# Patient Record
Sex: FEMALE | Race: WHITE | Employment: OTHER | URBAN - METROPOLITAN AREA
[De-identification: names, ages, dates, MRNs, and addresses within clinical notes are randomized per-mention and may not be internally consistent; named-entity substitution may affect disease eponyms.]

---

## 2017-11-15 ENCOUNTER — OFFICE VISIT (OUTPATIENT)
Dept: INTERNAL MEDICINE CLINIC | Age: 52
End: 2017-11-15

## 2017-11-15 VITALS
DIASTOLIC BLOOD PRESSURE: 78 MMHG | OXYGEN SATURATION: 100 % | WEIGHT: 199 LBS | BODY MASS INDEX: 30.16 KG/M2 | SYSTOLIC BLOOD PRESSURE: 117 MMHG | RESPIRATION RATE: 16 BRPM | HEIGHT: 68 IN | HEART RATE: 64 BPM | TEMPERATURE: 98.1 F

## 2017-11-15 DIAGNOSIS — G43.709 CHRONIC MIGRAINE WITHOUT AURA WITHOUT STATUS MIGRAINOSUS, NOT INTRACTABLE: ICD-10-CM

## 2017-11-15 DIAGNOSIS — Z00.00 PHYSICAL EXAM, ANNUAL: Primary | ICD-10-CM

## 2017-11-15 DIAGNOSIS — F51.01 PRIMARY INSOMNIA: ICD-10-CM

## 2017-11-15 DIAGNOSIS — Z23 ENCOUNTER FOR IMMUNIZATION: ICD-10-CM

## 2017-11-15 RX ORDER — PROGESTERONE 100 MG/1
CAPSULE ORAL
Refills: 3 | COMMUNITY
Start: 2017-10-23 | End: 2022-03-29

## 2017-11-15 RX ORDER — TEMAZEPAM 22.5 MG/1
CAPSULE ORAL
Refills: 0 | COMMUNITY
Start: 2017-10-30 | End: 2017-11-15 | Stop reason: SDUPTHER

## 2017-11-15 RX ORDER — TEMAZEPAM 22.5 MG/1
CAPSULE ORAL
Qty: 30 CAP | Refills: 3 | Status: SHIPPED | OUTPATIENT
Start: 2017-11-15 | End: 2018-03-20 | Stop reason: SDUPTHER

## 2017-11-15 RX ORDER — IBUPROFEN 800 MG/1
TABLET ORAL
Refills: 1 | COMMUNITY
Start: 2017-11-12

## 2017-11-15 NOTE — MR AVS SNAPSHOT
Visit Information Date & Time Provider Department Dept. Phone Encounter #  
 11/15/2017  9:30 AM Suman Gao, [address] [place] [address],[address] [phone] [id number] Upcoming Health Maintenance Date Due DTaP/Tdap/Td series (1 - Tdap) 2/27/1987 FOBT Q 1 YEAR AGE 50-75 2/27/2016 BREAST CANCER SCRN MAMMOGRAM 12/12/2018 PAP AKA CERVICAL CYTOLOGY 4/10/2020 Allergies as of 11/15/2017  Review Complete On: 11/15/2017 By: Suman Gao MD  
 No Known Allergies Current Immunizations  Never Reviewed Name Date Influenza Vaccine 11/1/2017 Not reviewed this visit You Were Diagnosed With   
  
 Codes Comments Physical exam, annual    -  Primary ICD-10-CM: Z00.00 ICD-9-CM: V70.0 Primary insomnia     ICD-10-CM: F51.01 
ICD-9-CM: 307.42 Chronic migraine without aura without status migrainosus, not intractable     ICD-10-CM: B02.039 ICD-9-CM: 346.70 Vitals BP Pulse Temp Resp Height(growth percentile) Weight(growth percentile) 117/78 (BP 1 Location: Left arm, BP Patient Position: Sitting) 64 98.1 °F (36.7 °C) (Oral) 16 5' 8\" (1.727 m) 199 lb (90.3 kg) SpO2 BMI OB Status Smoking Status 100% 30.26 kg/m2 Postmenopausal Never Smoker Vitals History BMI and BSA Data Body Mass Index Body Surface Area  
 30.26 kg/m 2 2.08 m 2 Preferred Pharmacy Pharmacy Name Phone [facility] [address] AT [address] [phone] Your Updated Medication List  
  
   
This list is accurate as of: 11/15/17  9:37 AM.  Always use your most recent med list.  
  
  
  
  
 ibuprofen 800 mg tablet Commonly known as:  MOTRIN  
TK 1 T PO Q 8 H PRN  
  
 progesterone 100 mg capsule Commonly known as:  PROMETRIUM TK ONE C PO QD  
  
 temazepam 22.5 mg capsule Commonly known as:  RESTORIL TK 1 C PO QHS PRF SLEEP Prescriptions Printed Refills temazepam (RESTORIL) 22.5 mg capsule 3 Sig: TK 1 C PO QHS PRF SLEEP Class: Print We Performed the Following CBC W/O DIFF [98411 CPT(R)] HEMOGLOBIN A1C WITH EAG [63874 CPT(R)] LIPID PANEL [73158 CPT(R)] METABOLIC PANEL, COMPREHENSIVE [98999 CPT(R)] OCCULT BLOOD, IMMUNOASSAY (FIT) D9545550 CPT(R)] TSH 3RD GENERATION [07260 CPT(R)] URINALYSIS W/ RFLX MICROSCOPIC [14452 CPT(R)] VITAMIN D, 25 HYDROXY [id number] CPT(R)] Introducing [facility]! Anita Mahan introduces [employer] patient portal. Now you can access parts of your medical record, email your doctor's office, and request medication refills online. 1. In your internet browser, go to [email or web address] 2. Click on the First Time User? Click Here link in the Sign In box. You will see the New Member Sign Up page. 3. Enter your [employer] Access Code exactly as it appears below. You will not need to use this code after youve completed the sign-up process. If you do not sign up before the expiration date, you must request a new code. · [employer] Access Code: [email or web address] Expires: 2/13/2018  9:37 AM 
 
4. Enter the last four digits of your Social Security Number (xxxx) and Date of Birth (mm/dd/yyyy) as indicated and click Submit. You will be taken to the next sign-up page. 5. Create a Jumpzter ID. This will be your [employer]ter login ID and cannot be changed, so think of one that is secure and easy to remember. 6. Create a [employer] password. You can change your password at any time. 7. Enter your Password Reset Question and Answer. This can be used at a later time if you forget your password. 8. Enter your e-mail address. You will receive e-mail notification when new information is available in [address] 9. Click Sign Up. You can now view and download portions of your medical record. 10. Click the Download Summary menu link to download a portable copy of your medical information. If you have questions, please visit the Frequently Asked Questions section of the [employer] website. Remember, [employer] is NOT to be used for urgent needs. For medical emergencies, dial 911. Now available from your iPhone and Android! Please provide this summary of care documentation to your next provider. Your primary care clinician is listed as Kaya Lou. If you have any questions after today's visit, please call [phone].
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
## 2017-11-24 LAB — HEMOCCULT STL QL IA: NEGATIVE

## 2018-03-20 DIAGNOSIS — F51.01 PRIMARY INSOMNIA: Primary | ICD-10-CM

## 2018-03-22 RX ORDER — TEMAZEPAM 22.5 MG/1
CAPSULE ORAL
Qty: 30 CAP | Refills: 2 | Status: SHIPPED | OUTPATIENT
Start: 2018-03-22 | End: 2018-03-28 | Stop reason: SDUPTHER

## 2018-03-22 NOTE — TELEPHONE ENCOUNTER
Following rx was faxed to pharmacy with confirmation received:      temazepam (RESTORIL) 22.5 mg capsule [[id number]]   Order Details   Dose, Route, Frequency: As Directed    Dispense Quantity:  30 Cap Refills:  2 Fills Remaining:  --           Sig: TAKE 1 CAPSULE BY MOUTH EVERY NIGHT AT BEDTIME AS NEEDED FOR SLEEP          Written Date:  03/22/18 Expiration Date:  --     Start Date:  03/22/18 End Date:  --            Ordering Provider:  -- NICKY #:  -- NPI:  --    Authorizing Provider:  Radha Parikh MD NICKY #:  [id number] NPI:  [id number]    Ordering User:  Radha Parikh MD

## 2018-03-28 DIAGNOSIS — F51.01 PRIMARY INSOMNIA: ICD-10-CM

## 2018-03-28 RX ORDER — TEMAZEPAM 22.5 MG/1
CAPSULE ORAL
Qty: 90 CAP | Refills: 1 | Status: SHIPPED | OUTPATIENT
Start: 2018-03-28 | End: 2018-10-04 | Stop reason: SDUPTHER

## 2018-03-28 NOTE — TELEPHONE ENCOUNTER
----- Message from Renuka Azar sent at 3/27/2018  4:51 PM EDT -----  Regarding: Dr. Joseph Del Real  Pt requesting for further refills of medication \"Tranzepam\" to be sent to [facility] mail order pharmacy with a 3 month supply.  Best contact number [phone]      Message copied/pasted from [facility]

## 2018-03-28 NOTE — TELEPHONE ENCOUNTER
PCP: Elias Pickard MD    Last appt: 11/15/2017  Future Appointments  Date Time Provider Emily Rivas   11/19/2018 10:30 AM Elias Pickard MD [facility]       Requested Prescriptions     Pending Prescriptions Disp Refills    temazepam (RESTORIL) 22.5 mg capsule 90 Cap 1     Sig: TAKE 1 CAPSULE BY MOUTH EVERY NIGHT AT BEDTIME AS NEEDED FOR SLEEP

## 2018-03-29 ENCOUNTER — DOCUMENTATION ONLY (OUTPATIENT)
Dept: INTERNAL MEDICINE CLINIC | Age: 53
End: 2018-03-29

## 2018-03-29 NOTE — PROGRESS NOTES
Following rx was faxed to pharmacy with confirmation received:      temazepam (RESTORIL) 22.5 mg capsule [[id number]]   Order Details   Dose, Route, Frequency: As Directed    Dispense Quantity:  90 Cap Refills:  1 Fills Remaining:  --           Sig: TAKE 1 CAPSULE BY MOUTH EVERY NIGHT AT BEDTIME AS NEEDED FOR SLEEP          Written Date:  03/28/18 Expiration Date:  --     Start Date:  03/28/18 End Date:  --

## 2018-04-02 ENCOUNTER — TELEPHONE (OUTPATIENT)
Dept: INTERNAL MEDICINE CLINIC | Age: 53
End: 2018-04-02

## 2018-04-02 NOTE — TELEPHONE ENCOUNTER
Pt stated she called last week regarding her 'Temazepam\"  22 mg medication to be sent to mail order but the pharmacy has not receive anything.  Best contact [phone]     Message received & copied from [facility]

## 2018-04-02 NOTE — TELEPHONE ENCOUNTER
Called and spoke to pt. Two pt identifiers confirmed. Confirmed with pt correct pharmacy. Pt confirmed [facility] care gabriella. Confirmed with pt local pharmacy is still [facility] on file. Rx for Temazepam faxed to [facility]. Fax confirmation received. Called [facility] and canceled rx for Temazepam.    Pt had no further comments or questions at time of call.

## 2018-09-18 ENCOUNTER — OFFICE VISIT (OUTPATIENT)
Dept: INTERNAL MEDICINE CLINIC | Age: 53
End: 2018-09-18

## 2018-09-18 VITALS
TEMPERATURE: 98.3 F | DIASTOLIC BLOOD PRESSURE: 79 MMHG | HEIGHT: 68 IN | OXYGEN SATURATION: 98 % | SYSTOLIC BLOOD PRESSURE: 121 MMHG | WEIGHT: 200 LBS | RESPIRATION RATE: 16 BRPM | BODY MASS INDEX: 30.31 KG/M2 | HEART RATE: 87 BPM

## 2018-09-18 DIAGNOSIS — Z13.220 LIPID SCREENING: ICD-10-CM

## 2018-09-18 DIAGNOSIS — L02.92 BOIL: ICD-10-CM

## 2018-09-18 DIAGNOSIS — G43.709 CHRONIC MIGRAINE WITHOUT AURA WITHOUT STATUS MIGRAINOSUS, NOT INTRACTABLE: Primary | ICD-10-CM

## 2018-09-18 DIAGNOSIS — B37.9 YEAST INFECTION: ICD-10-CM

## 2018-09-18 DIAGNOSIS — Z13.1 ENCOUNTER FOR SCREENING EXAMINATION FOR IMPAIRED GLUCOSE REGULATION AND DIABETES MELLITUS: ICD-10-CM

## 2018-09-18 DIAGNOSIS — Z23 ENCOUNTER FOR IMMUNIZATION: ICD-10-CM

## 2018-09-18 DIAGNOSIS — Z12.11 COLON CANCER SCREENING: ICD-10-CM

## 2018-09-18 DIAGNOSIS — E55.9 VITAMIN D DEFICIENCY: ICD-10-CM

## 2018-09-18 RX ORDER — FLUCONAZOLE 150 MG/1
150 TABLET ORAL DAILY
Qty: 1 TAB | Refills: 0 | Status: SHIPPED | OUTPATIENT
Start: 2018-09-18 | End: 2018-09-19

## 2018-09-18 RX ORDER — SULFAMETHOXAZOLE AND TRIMETHOPRIM 800; 160 MG/1; MG/1
1 TABLET ORAL 2 TIMES DAILY
Qty: 20 TAB | Refills: 0 | Status: SHIPPED | OUTPATIENT
Start: 2018-09-18 | End: 2018-09-28

## 2018-09-18 RX ORDER — GABAPENTIN 300 MG/1
300 CAPSULE ORAL
COMMUNITY
End: 2022-01-06 | Stop reason: SDUPTHER

## 2018-09-18 RX ORDER — RIZATRIPTAN BENZOATE 10 MG/1
10 TABLET ORAL
Qty: 6 TAB | Refills: 1 | Status: SHIPPED | OUTPATIENT
Start: 2018-09-18 | End: 2018-12-13 | Stop reason: SDUPTHER

## 2018-09-18 RX ORDER — MELOXICAM 15 MG/1
15 TABLET ORAL
COMMUNITY
End: 2021-09-28

## 2018-09-18 RX ORDER — RIZATRIPTAN BENZOATE 10 MG/1
10 TABLET ORAL
COMMUNITY
End: 2018-09-18 | Stop reason: SDUPTHER

## 2018-09-18 RX ORDER — CEPHALEXIN 500 MG/1
500 CAPSULE ORAL 3 TIMES DAILY
Qty: 30 CAP | Refills: 0 | Status: SHIPPED | OUTPATIENT
Start: 2018-09-18 | End: 2018-09-28

## 2018-09-18 NOTE — LETTER
10/29/2018 3:44 PM 
 
Ms. Kassy Figueroa 
[address] [address] Dear Arash Figueroa: 
 
Please find your most recent results below. Resulted Orders OCCULT BLOOD IMMUNOASSAY,DIAGNOSTIC Result Value Ref Range Occult blood fecal, by IA Negative Negative Narrative Performed at:  [address]  [id number] : Justa Oneill MD, Phone:  [phone] RECOMMENDATIONS: 
None. Keep up the good work! Please call me if you have any questions: [phone] Sincerely, 
 
 
Mary Emmanuel MD
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
## 2018-09-18 NOTE — MR AVS SNAPSHOT
[address] [facility] 
[phone] Patient: Ziyad Barreto MRN: [id number] :[id number] Visit Information Date & Time Provider Department Dept. Phone Encounter #  
 2018 12:00 PM Chapis Correa 2000 [facility] [phone] [id number] Follow-up Instructions Return if symptoms worsen or fail to improve. Your Appointments 2018 10:30 AM  
PHYSICAL with Chapis Correa 2000 [facility] Appt Note: 1 year CPE  
 [facility] [address]   
[address] [place] [address] [address] [facility] Upcoming Health Maintenance Date Due Influenza Age 5 to Adult 2018 BREAST CANCER SCRN MAMMOGRAM 2018 FOBT Q 1 YEAR AGE 50-75 2018 PAP AKA CERVICAL CYTOLOGY 4/10/2020 DTaP/Tdap/Td series (2 - Td) 11/15/2027 Allergies as of 2018  Review Complete On: 2018 By: Chapis Correa MD  
 No Known Allergies Current Immunizations  Never Reviewed Name Date Influenza Vaccine 2017 Tdap 11/15/2017 Not reviewed this visit You Were Diagnosed With   
  
 Codes Comments Chronic migraine without aura without status migrainosus, not intractable    -  Primary ICD-10-CM: Y07.968 ICD-9-CM: 346.70 Boil     ICD-10-CM: L02.92 
ICD-9-CM: 680.9 Yeast infection     ICD-10-CM: B37.9 ICD-9-CM: 112.9 Lipid screening     ICD-10-CM: Z50.452 ICD-9-CM: V77.91 Encounter for screening examination for impaired glucose regulation and diabetes mellitus     ICD-10-CM: Z13.1 ICD-9-CM: V77.1 Vitamin D deficiency     ICD-10-CM: E55.9 ICD-9-CM: 268.9 Colon cancer screening     ICD-10-CM: Z12.11 ICD-9-CM: V76.51 Vitals BP Pulse Temp Resp Height(growth percentile) Weight(growth percentile) 121/79 (BP 1 Location: Left arm, BP Patient Position: Sitting) 87 98.3 °F (36.8 °C) (Oral) 16 5' 8\" (1.727 m) 200 lb (90.7 kg) SpO2 BMI OB Status Smoking Status 98% 30.41 kg/m2 Postmenopausal Never Smoker Vitals History BMI and BSA Data Body Mass Index Body Surface Area  
 30.41 kg/m 2 2.09 m 2 Preferred Pharmacy Pharmacy Name Phone [facility], [facility] [address] [place] [address] Drive [phone] Your Updated Medication List  
  
   
This list is accurate as of 9/18/18 12:15 PM.  Always use your most recent med list.  
  
  
  
  
 cephALEXin 500 mg capsule Commonly known as:  Devoria Hertz Take 1 Cap by mouth three (3) times daily for 10 days. fluconazole 150 mg tablet Commonly known as:  DIFLUCAN Take 1 Tab by mouth daily for 1 day. FDA advises cautious prescribing of oral fluconazole in pregnancy. gabapentin 300 mg capsule Commonly known as:  NEURONTIN Take 300 mg by mouth nightly. ibuprofen 800 mg tablet Commonly known as:  MOTRIN  
TK 1 T PO Q 8 H PRN  
  
 meloxicam 15 mg tablet Commonly known as:  MOBIC Take 15 mg by mouth nightly. progesterone 100 mg capsule Commonly known as:  PROMETRIUM TK ONE C PO QD  
  
 rizatriptan 10 mg tablet Commonly known as:  [place] Trotter Take 1 Tab by mouth once as needed for Migraine for up to 1 dose. May repeat in 2 hours if needed  
  
 temazepam 22.5 mg capsule Commonly known as:  RESTORIL  
TAKE 1 CAPSULE BY MOUTH EVERY NIGHT AT BEDTIME AS NEEDED FOR SLEEP  
  
 trimethoprim-sulfamethoxazole 160-800 mg per tablet Commonly known as:  BACTRIM DS, SEPTRA DS Take 1 Tab by mouth two (2) times a day for 10 days. Prescriptions Sent to Pharmacy Refills  
 rizatriptan (MAXALT) 10 mg tablet 1 Sig: Take 1 Tab by mouth once as needed for Migraine for up to 1 dose. May repeat in 2 hours if needed  Class: Normal  
 Pharmacy: [facility] [address] Ph #: [phone] Route: Oral  
 fluconazole (DIFLUCAN) 150 mg tablet 0 Sig: Take 1 Tab by mouth daily for 1 day. FDA advises cautious prescribing of oral fluconazole in pregnancy. Class: Normal  
 Pharmacy: [facility] [address] Ph #: [phone] Route: Oral  
 cephALEXin (KEFLEX) 500 mg capsule 0 Sig: Take 1 Cap by mouth three (3) times daily for 10 days. Class: Normal  
 Pharmacy: [facility] [address] Ph #: [phone] Route: Oral  
 trimethoprim-sulfamethoxazole (BACTRIM DS, SEPTRA DS) 160-800 mg per tablet 0 Sig: Take 1 Tab by mouth two (2) times a day for 10 days. Class: Normal  
 Pharmacy: [facility] [address] Ph #: [phone] Route: Oral  
  
Follow-up Instructions Return if symptoms worsen or fail to improve. To-Do List   
 09/19/2018 Lab:  CBC W/O DIFF   
  
 09/19/2018 Lab:  HEMOGLOBIN A1C WITH EAG   
  
 09/19/2018 Lab:  LIPID PANEL   
  
 09/19/2018 Lab:  METABOLIC PANEL, COMPREHENSIVE   
  
 09/19/2018 Lab:  OCCULT BLOOD IMMUNOASSAY,DIAGNOSTIC   
  
 09/19/2018 Lab:  TSH 3RD GENERATION   
  
 09/19/2018 Lab:  VITAMIN D, 25 HYDROXY Patient Instructions Labs one week before appointment Schedule colonoscopy /or send in FIT test  
 
 
  
Introducing [facility]! [employer] introduces [employer] patient portal. Now you can access parts of your medical record, email your doctor's office, and request medication refills online. 1. In your internet browser, go to [email or web address] 2. Click on the First Time User? Click Here link in the Sign In box. You will see the New Member Sign Up page. 3. Enter your [employer] Access Code exactly as it appears below. You will not need to use this code after youve completed the sign-up process. If you do not sign up before the expiration date, you must request a new code. · [facility] Access Code: [facility] Expires: 12/17/2018 12:14 PM 
 
4. Enter the last four digits of your Social Security Number (xxxx) and Date of Birth (mm/dd/yyyy) as indicated and click Submit. You will be taken to the next sign-up page. 5. Create a [employer] ID. This will be your [facility]Pixies login ID and cannot be changed, so think of one that is secure and easy to remember. 6. Create a [facility] password. You can change your password at any time. 7. Enter your Password Reset Question and Answer. This can be used at a later time if you forget your password. 8. Enter your e-mail address. You will receive e-mail notification when new information is available in [address] 9. Click Sign Up. You can now view and download portions of your medical record. 10. Click the Download Summary menu link to download a portable copy of your medical information. If you have questions, please visit the Frequently Asked Questions section of the [employer] website. Remember, [facility] is NOT to be used for urgent needs. For medical emergencies, dial 911. Now available from your iPhone and Android! Please provide this summary of care documentation to your next provider. Your primary care clinician is listed as Luca Gamboa. If you have any questions after today's visit, please call [phone].

## 2018-10-04 DIAGNOSIS — F51.01 PRIMARY INSOMNIA: ICD-10-CM

## 2018-10-04 RX ORDER — TEMAZEPAM 22.5 MG/1
CAPSULE ORAL
Qty: 90 CAP | Refills: 1 | Status: SHIPPED | OUTPATIENT
Start: 2018-10-04 | End: 2019-03-25 | Stop reason: SDUPTHER

## 2018-10-04 NOTE — TELEPHONE ENCOUNTER
Patient needs refills for 90 day supplies with refills thru [facility] order indicated.  Thank you

## 2018-10-04 NOTE — TELEPHONE ENCOUNTER
PCP: Keven Riley MD    Last appt: 9/18/2018  Future Appointments  Date Time Provider Emily Rivas   11/19/2018 10:30 AM Keven Riley MD [facility] 87       Requested Prescriptions     Pending Prescriptions Disp Refills    temazepam (RESTORIL) 22.5 mg capsule 90 Cap 1     Sig: TAKE 1 CAPSULE BY MOUTH EVERY NIGHT AT BEDTIME AS NEEDED FOR SLEEP

## 2018-10-05 NOTE — TELEPHONE ENCOUNTER
Following rx was faxed to pharmacy with confirmation received:    temazepam (RESTORIL) 22.5 mg capsule  TAKE 1 CAPSULE BY MOUTH EVERY NIGHT AT BEDTIME AS NEEDED FOR SLEEP  Disp: 90 Cap Refills: 1    Class: Print Start: 10/4/2018   For: Primary insomnia  Approved by: Clinton Elena

## 2018-10-28 LAB — HEMOCCULT STL QL IA: NEGATIVE

## 2018-11-15 ENCOUNTER — LAB ONLY (OUTPATIENT)
Dept: INTERNAL MEDICINE CLINIC | Age: 53
End: 2018-11-15

## 2018-11-15 DIAGNOSIS — Z13.220 LIPID SCREENING: ICD-10-CM

## 2018-11-15 DIAGNOSIS — G43.709 CHRONIC MIGRAINE WITHOUT AURA WITHOUT STATUS MIGRAINOSUS, NOT INTRACTABLE: ICD-10-CM

## 2018-11-15 DIAGNOSIS — E55.9 VITAMIN D DEFICIENCY: ICD-10-CM

## 2018-11-15 DIAGNOSIS — Z12.11 COLON CANCER SCREENING: ICD-10-CM

## 2018-11-15 DIAGNOSIS — L02.92 BOIL: ICD-10-CM

## 2018-11-15 DIAGNOSIS — Z13.1 ENCOUNTER FOR SCREENING EXAMINATION FOR IMPAIRED GLUCOSE REGULATION AND DIABETES MELLITUS: ICD-10-CM

## 2018-11-15 DIAGNOSIS — B37.9 YEAST INFECTION: ICD-10-CM

## 2018-11-16 LAB
25(OH)D3+25(OH)D2 SERPL-MCNC: 29.3 NG/ML (ref 30–100)
ALBUMIN SERPL-MCNC: 4.1 G/DL (ref 3.5–5.5)
ALBUMIN/GLOB SERPL: 1.8 {RATIO} (ref 1.2–2.2)
ALP SERPL-CCNC: 53 IU/L (ref 39–117)
ALT SERPL-CCNC: 18 IU/L (ref 0–32)
AST SERPL-CCNC: 24 IU/L (ref 0–40)
BILIRUB SERPL-MCNC: 0.6 MG/DL (ref 0–1.2)
BUN SERPL-MCNC: 11 MG/DL (ref 6–24)
BUN/CREAT SERPL: 11 (ref 9–23)
CALCIUM SERPL-MCNC: 9.1 MG/DL (ref 8.7–10.2)
CHLORIDE SERPL-SCNC: 105 MMOL/L (ref 96–106)
CHOLEST SERPL-MCNC: 176 MG/DL (ref 100–199)
CO2 SERPL-SCNC: 27 MMOL/L (ref 20–29)
CREAT SERPL-MCNC: 1 MG/DL (ref 0.57–1)
ERYTHROCYTE [DISTWIDTH] IN BLOOD BY AUTOMATED COUNT: 13.5 % (ref 12.3–15.4)
EST. AVERAGE GLUCOSE BLD GHB EST-MCNC: 80 MG/DL
GLOBULIN SER CALC-MCNC: 2.3 G/DL (ref 1.5–4.5)
GLUCOSE SERPL-MCNC: 93 MG/DL (ref 65–99)
HBA1C MFR BLD: 4.4 % (ref 4.8–5.6)
HCT VFR BLD AUTO: 42.7 % (ref 34–46.6)
HDLC SERPL-MCNC: 57 MG/DL
HGB BLD-MCNC: 14.5 G/DL (ref 11.1–15.9)
LDLC SERPL CALC-MCNC: 104 MG/DL (ref 0–99)
MCH RBC QN AUTO: 30.7 PG (ref 26.6–33)
MCHC RBC AUTO-ENTMCNC: 34 G/DL (ref 31.5–35.7)
MCV RBC AUTO: 90 FL (ref 79–97)
PLATELET # BLD AUTO: 227 X10E3/UL (ref 150–379)
POTASSIUM SERPL-SCNC: 5.3 MMOL/L (ref 3.5–5.2)
PROT SERPL-MCNC: 6.4 G/DL (ref 6–8.5)
RBC # BLD AUTO: 4.73 X10E6/UL (ref 3.77–5.28)
SODIUM SERPL-SCNC: 142 MMOL/L (ref 134–144)
TRIGL SERPL-MCNC: 74 MG/DL (ref 0–149)
TSH SERPL DL<=0.005 MIU/L-ACNC: 0.97 UIU/ML (ref 0.45–4.5)
VLDLC SERPL CALC-MCNC: 15 MG/DL (ref 5–40)
WBC # BLD AUTO: 7.3 X10E3/UL (ref 3.4–10.8)

## 2018-11-19 ENCOUNTER — OFFICE VISIT (OUTPATIENT)
Dept: INTERNAL MEDICINE CLINIC | Age: 53
End: 2018-11-19

## 2018-11-19 VITALS
OXYGEN SATURATION: 98 % | WEIGHT: 197 LBS | DIASTOLIC BLOOD PRESSURE: 76 MMHG | TEMPERATURE: 98 F | SYSTOLIC BLOOD PRESSURE: 128 MMHG | RESPIRATION RATE: 16 BRPM | HEART RATE: 54 BPM | BODY MASS INDEX: 29.86 KG/M2 | HEIGHT: 68 IN

## 2018-11-19 DIAGNOSIS — F51.01 PRIMARY INSOMNIA: ICD-10-CM

## 2018-11-19 DIAGNOSIS — Z00.00 PHYSICAL EXAM, ANNUAL: Primary | ICD-10-CM
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
## 2019-01-04 ENCOUNTER — TELEPHONE (OUTPATIENT)
Dept: INTERNAL MEDICINE CLINIC | Age: 54
End: 2019-01-04

## 2019-01-04 RX ORDER — RIZATRIPTAN BENZOATE 10 MG/1
TABLET ORAL
Qty: 6 TAB | Refills: 1 | Status: SHIPPED | OUTPATIENT
Start: 2019-01-04 | End: 2019-06-05 | Stop reason: SDUPTHER

## 2019-01-04 NOTE — TELEPHONE ENCOUNTER
Patient states she needs a Prior Auth for her temazepam (RESTORIL) 22.5 mg capsule thru [employer] & is running low on her medication. Patient states [employer] advised request was sent 7 no response. Please call Pharmacy & then patient to update.  Thank you

## 2019-03-25 DIAGNOSIS — F51.01 PRIMARY INSOMNIA: ICD-10-CM

## 2019-03-25 RX ORDER — TEMAZEPAM 22.5 MG/1
CAPSULE ORAL
Qty: 90 CAP | Refills: 1 | Status: SHIPPED | OUTPATIENT
Start: 2019-03-25 | End: 2019-09-19 | Stop reason: SDUPTHER

## 2019-03-25 NOTE — TELEPHONE ENCOUNTER
Following rx was faxed to pharmacy with confirmation received:      temazepam (RESTORIL) 22.5 mg capsule          Sig: TAKE 1 CAPSULE BY MOUTH EVERY NIGHT AT BEDTIME AS NEEDED FOR SLEEP    Disp:  90 Cap    Refills:  1    Start: 3/25/2019    Class: Print

## 2019-03-25 NOTE — TELEPHONE ENCOUNTER
PCP: Risa Sullivan MD    Last appt: 11/19/2018  Future Appointments   Date Time Provider Emily Rivas   11/20/2019  9:15 AM Risa Sullivan MD [facility] 87       Requested Prescriptions     Pending Prescriptions Disp Refills    temazepam (RESTORIL) 22.5 mg capsule 90 Cap 1     Sig: TAKE 1 CAPSULE BY MOUTH EVERY NIGHT AT BEDTIME AS NEEDED FOR SLEEP

## 2019-04-09 ENCOUNTER — TELEPHONE (OUTPATIENT)
Dept: INTERNAL MEDICINE CLINIC | Age: 54
End: 2019-04-09

## 2019-04-09 NOTE — TELEPHONE ENCOUNTER
Spoke with Sarah Benites emp # [id number] and advised him that request had been faxed to [facility] on 4/5/19.

## 2019-04-09 NOTE — TELEPHONE ENCOUNTER
Nati Hylton from [facility] insurance wants to check the status of a medical records request for the patient. Best contact number is [phone] Fax [phone].        Message received & copied from Naga Rose leighton

## 2019-06-06 RX ORDER — RIZATRIPTAN BENZOATE 10 MG/1
TABLET ORAL
Qty: 6 TAB | Refills: 1 | Status: SHIPPED | OUTPATIENT
Start: 2019-06-06 | End: 2019-09-19 | Stop reason: SDUPTHER

## 2019-09-24 ENCOUNTER — DOCUMENTATION ONLY (OUTPATIENT)
Dept: INTERNAL MEDICINE CLINIC | Age: 54
End: 2019-09-24

## 2019-09-24 NOTE — PROGRESS NOTES
Following rx was faxed to pharmacy with confirmation received:      temazepam (RESTORIL) 22.5 mg capsule [[id number]]     Order Details   Dose, Route, Frequency: As Directed    Dispense Quantity: 90 Cap Refills: 0 Fills remaining: --           Sig: TAKE 1 CAPSULE BY MOUTH EVERY NIGHT AT BEDTIME AS NEEDED FOR SLEEP          Written Date: 09/21/19 Expiration Date: --     Start Date: 09/21/19 End Date: --

## 2019-12-24 ENCOUNTER — OFFICE VISIT (OUTPATIENT)
Dept: INTERNAL MEDICINE CLINIC | Age: 54
End: 2019-12-24

## 2019-12-24 VITALS
TEMPERATURE: 97.5 F | RESPIRATION RATE: 16 BRPM | OXYGEN SATURATION: 100 % | HEART RATE: 59 BPM | SYSTOLIC BLOOD PRESSURE: 116 MMHG | DIASTOLIC BLOOD PRESSURE: 74 MMHG | WEIGHT: 184 LBS | HEIGHT: 68 IN | BODY MASS INDEX: 27.89 KG/M2

## 2019-12-24 DIAGNOSIS — Z00.00 PHYSICAL EXAM, ANNUAL: Primary | ICD-10-CM

## 2019-12-24 DIAGNOSIS — F51.01 PRIMARY INSOMNIA: ICD-10-CM
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
## 2019-12-25 LAB
ALBUMIN SERPL-MCNC: 4.2 G/DL (ref 3.5–5.5)
ALBUMIN/GLOB SERPL: 1.8 {RATIO} (ref 1.2–2.2)
ALP SERPL-CCNC: 52 IU/L (ref 39–117)
ALT SERPL-CCNC: 20 IU/L (ref 0–32)
AST SERPL-CCNC: 20 IU/L (ref 0–40)
BILIRUB SERPL-MCNC: 0.7 MG/DL (ref 0–1.2)
BUN SERPL-MCNC: 13 MG/DL (ref 6–24)
BUN/CREAT SERPL: 14 (ref 9–23)
CALCIUM SERPL-MCNC: 9.1 MG/DL (ref 8.7–10.2)
CHLORIDE SERPL-SCNC: 104 MMOL/L (ref 96–106)
CHOLEST SERPL-MCNC: 180 MG/DL (ref 100–199)
CO2 SERPL-SCNC: 26 MMOL/L (ref 20–29)
CREAT SERPL-MCNC: 0.95 MG/DL (ref 0.57–1)
ERYTHROCYTE [DISTWIDTH] IN BLOOD BY AUTOMATED COUNT: 12.2 % (ref 12.3–15.4)
EST. AVERAGE GLUCOSE BLD GHB EST-MCNC: 85 MG/DL
GLOBULIN SER CALC-MCNC: 2.3 G/DL (ref 1.5–4.5)
GLUCOSE SERPL-MCNC: 84 MG/DL (ref 65–99)
HBA1C MFR BLD: 4.6 % (ref 4.8–5.6)
HCT VFR BLD AUTO: 42.5 % (ref 34–46.6)
HDLC SERPL-MCNC: 66 MG/DL
HGB BLD-MCNC: 14.6 G/DL (ref 11.1–15.9)
LDLC SERPL CALC-MCNC: 102 MG/DL (ref 0–99)
MCH RBC QN AUTO: 31.1 PG (ref 26.6–33)
MCHC RBC AUTO-ENTMCNC: 34.4 G/DL (ref 31.5–35.7)
MCV RBC AUTO: 91 FL (ref 79–97)
PLATELET # BLD AUTO: 206 X10E3/UL (ref 150–450)
POTASSIUM SERPL-SCNC: 4.9 MMOL/L (ref 3.5–5.2)
PROT SERPL-MCNC: 6.5 G/DL (ref 6–8.5)
RBC # BLD AUTO: 4.69 X10E6/UL (ref 3.77–5.28)
SODIUM SERPL-SCNC: 141 MMOL/L (ref 134–144)
TRIGL SERPL-MCNC: 58 MG/DL (ref 0–149)
TSH SERPL DL<=0.005 MIU/L-ACNC: 1.15 UIU/ML (ref 0.45–4.5)
VLDLC SERPL CALC-MCNC: 12 MG/DL (ref 5–40)
WBC # BLD AUTO: 6.7 X10E3/UL (ref 3.4–10.8)

## 2020-01-13 ENCOUNTER — TELEPHONE (OUTPATIENT)
Dept: INTERNAL MEDICINE CLINIC | Age: 55
End: 2020-01-13

## 2020-01-13 NOTE — LETTER
1/13/2020 10:35 AM 
 
Ms. Kassy Figueroa 
[address] [place] [id number] To whom this may concern, 
 
 
 Ms. Osorio Kirkland currently is not on any treatments/medications for arthritis in her finger for it is not currently needed. If there are any questions or concerns, feel free to contact the office. Thank you.    
 
Sincerely, 
 
 
Mabel Kelly MD

## 2020-01-13 NOTE — TELEPHONE ENCOUNTER
Spoke to pt. Two pt identifiers confirmed. Pt informed that the form was signed; but there seems that records need to be released r/t Arthritis. Pt states that she spoke to application person and they told her as long as PCP signs the forms and states that \"pt is not on any treatment/medications for arthritis (thumb)\", it will be fine. Dmitry Wiggins, letter drafted--signed. Pt informed she can  forms this afternoon. Pt verbalized understanding of information discussed w/ no further questions at this time.

## 2020-04-11 RX ORDER — RIZATRIPTAN BENZOATE 10 MG/1
TABLET ORAL
Qty: 6 TAB | Refills: 1 | Status: SHIPPED | OUTPATIENT
Start: 2020-04-11 | End: 2020-08-02

## 2020-04-24 ENCOUNTER — TELEPHONE (OUTPATIENT)
Dept: INTERNAL MEDICINE CLINIC | Age: 55
End: 2020-04-24

## 2020-04-24 NOTE — TELEPHONE ENCOUNTER
#[phone]  Pt states there needs to be a PA on her sleep medication. This is through [facility] [employer] order.

## 2020-04-24 NOTE — TELEPHONE ENCOUNTER
PA initiated through [facility]. Awaiting response. Per CMM, response can take up to 24hrs. Will notify pt and pharmacy once determination received.

## 2020-04-27 NOTE — TELEPHONE ENCOUNTER
**PA approved-will inform pt. Called, spoke to pt. Two pt identifiers confirmed. Informed pt that PA was approved and determination was faxed to [facility]. Pt verbalized understanding of information discussed w/ no further questions at this time.

## 2020-08-01 DIAGNOSIS — F51.01 PRIMARY INSOMNIA: ICD-10-CM

## 2020-08-02 RX ORDER — TEMAZEPAM 22.5 MG/1
CAPSULE ORAL
Qty: 90 CAP | Refills: 1 | Status: SHIPPED | OUTPATIENT
Start: 2020-08-02 | End: 2021-01-17

## 2020-08-02 RX ORDER — RIZATRIPTAN BENZOATE 10 MG/1
TABLET ORAL
Qty: 6 TAB | Refills: 1 | Status: SHIPPED | OUTPATIENT
Start: 2020-08-02 | End: 2021-01-17
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
## 2020-12-28 ENCOUNTER — VIRTUAL VISIT (OUTPATIENT)
Dept: INTERNAL MEDICINE CLINIC | Age: 55
End: 2020-12-28
Payer: COMMERCIAL

## 2020-12-28 DIAGNOSIS — F51.01 PRIMARY INSOMNIA: ICD-10-CM

## 2020-12-28 DIAGNOSIS — Z00.00 PHYSICAL EXAM: Primary | ICD-10-CM

## 2020-12-28 PROCEDURE — 99213 OFFICE O/P EST LOW 20 MIN: CPT | Performed by: INTERNAL MEDICINE

## 2021-01-14 DIAGNOSIS — F51.01 PRIMARY INSOMNIA: ICD-10-CM

## 2021-01-16 LAB
ALBUMIN SERPL-MCNC: 4.3 G/DL (ref 3.8–4.9)
ALBUMIN/GLOB SERPL: 2.4 {RATIO} (ref 1.2–2.2)
ALP SERPL-CCNC: 52 IU/L (ref 39–117)
ALT SERPL-CCNC: 17 IU/L (ref 0–32)
AST SERPL-CCNC: 20 IU/L (ref 0–40)
BILIRUB SERPL-MCNC: 0.6 MG/DL (ref 0–1.2)
BUN SERPL-MCNC: 13 MG/DL (ref 6–24)
BUN/CREAT SERPL: 15 (ref 9–23)
CALCIUM SERPL-MCNC: 9 MG/DL (ref 8.7–10.2)
CHLORIDE SERPL-SCNC: 104 MMOL/L (ref 96–106)
CHOLEST SERPL-MCNC: 169 MG/DL (ref 100–199)
CO2 SERPL-SCNC: 27 MMOL/L (ref 20–29)
CREAT SERPL-MCNC: 0.87 MG/DL (ref 0.57–1)
ERYTHROCYTE [DISTWIDTH] IN BLOOD BY AUTOMATED COUNT: 12.3 % (ref 11.7–15.4)
EST. AVERAGE GLUCOSE BLD GHB EST-MCNC: 85 MG/DL
GLOBULIN SER CALC-MCNC: 1.8 G/DL (ref 1.5–4.5)
GLUCOSE SERPL-MCNC: 90 MG/DL (ref 65–99)
HBA1C MFR BLD: 4.6 % (ref 4.8–5.6)
HCT VFR BLD AUTO: 39.9 % (ref 34–46.6)
HDLC SERPL-MCNC: 68 MG/DL
HGB BLD-MCNC: 14.2 G/DL (ref 11.1–15.9)
LDLC SERPL CALC-MCNC: 89 MG/DL (ref 0–99)
MCH RBC QN AUTO: 30.9 PG (ref 26.6–33)
MCHC RBC AUTO-ENTMCNC: 35.6 G/DL (ref 31.5–35.7)
MCV RBC AUTO: 87 FL (ref 79–97)
PLATELET # BLD AUTO: 192 X10E3/UL (ref 150–450)
POTASSIUM SERPL-SCNC: 4.3 MMOL/L (ref 3.5–5.2)
PROT SERPL-MCNC: 6.1 G/DL (ref 6–8.5)
RBC # BLD AUTO: 4.6 X10E6/UL (ref 3.77–5.28)
SODIUM SERPL-SCNC: 140 MMOL/L (ref 134–144)
TRIGL SERPL-MCNC: 63 MG/DL (ref 0–149)
TSH SERPL DL<=0.005 MIU/L-ACNC: 1.84 UIU/ML (ref 0.45–4.5)
VLDLC SERPL CALC-MCNC: 12 MG/DL (ref 5–40)
WBC # BLD AUTO: 6.2 X10E3/UL (ref 3.4–10.8)

## 2021-01-17 RX ORDER — RIZATRIPTAN BENZOATE 10 MG/1
TABLET ORAL
Qty: 6 TAB | Refills: 1 | Status: SHIPPED | OUTPATIENT
Start: 2021-01-17 | End: 2021-08-05

## 2021-01-17 RX ORDER — TEMAZEPAM 22.5 MG/1
CAPSULE ORAL
Qty: 90 CAP | Refills: 1 | Status: SHIPPED | OUTPATIENT
Start: 2021-01-17 | End: 2021-08-05

## 2021-02-22 ENCOUNTER — TELEPHONE (OUTPATIENT)
Dept: INTERNAL MEDICINE CLINIC | Age: 56
End: 2021-02-22

## 2021-02-22 NOTE — TELEPHONE ENCOUNTER
Pt called stating she is having trouble swallowing and that pills are getting \"hung up in her throat\". Pt states about 2 week duration and has been getting worse. Pt states she needs an in office appt as soon as possible. Nothing in office I can offer for some time. Pt request to be squeezed in. Please call to advise. [phone]
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
## 2021-02-22 NOTE — TELEPHONE ENCOUNTER
Called out and spoke to pt. Two pt identifiers confirmed. Pt states that she is having trouble swallowing her pills such as her progesterone and they get hung up. Pt offered and accepted in-office appt for 2/24/21 at 1345. Pt verbalized understanding of information discussed w/ no further questions at this time.

## 2021-02-24 ENCOUNTER — OFFICE VISIT (OUTPATIENT)
Dept: INTERNAL MEDICINE CLINIC | Age: 56
End: 2021-02-24
Payer: COMMERCIAL

## 2021-02-24 VITALS
HEART RATE: 72 BPM | OXYGEN SATURATION: 98 % | RESPIRATION RATE: 16 BRPM | HEIGHT: 68 IN | SYSTOLIC BLOOD PRESSURE: 126 MMHG | DIASTOLIC BLOOD PRESSURE: 85 MMHG | TEMPERATURE: 97.8 F | WEIGHT: 187.8 LBS | BODY MASS INDEX: 28.46 KG/M2

## 2021-02-24 DIAGNOSIS — F51.01 PRIMARY INSOMNIA: ICD-10-CM

## 2021-02-24 DIAGNOSIS — Z00.00 ANNUAL PHYSICAL EXAM: Primary | ICD-10-CM

## 2021-02-24 DIAGNOSIS — R13.19 ESOPHAGEAL DYSPHAGIA: ICD-10-CM

## 2021-02-24 DIAGNOSIS — C44.311 BASAL CELL CARCINOMA (BCC) OF SKIN OF NOSE: ICD-10-CM

## 2021-02-24 PROBLEM — C44.91 BCC (BASAL CELL CARCINOMA OF SKIN): Status: ACTIVE | Noted: 2021-02-24

## 2021-02-24 PROCEDURE — 99396 PREV VISIT EST AGE 40-64: CPT | Performed by: INTERNAL MEDICINE

## 2021-02-24 RX ORDER — PANTOPRAZOLE SODIUM 20 MG/1
20 TABLET, DELAYED RELEASE ORAL DAILY
Qty: 30 TAB | Refills: 0 | Status: SHIPPED | OUTPATIENT
Start: 2021-02-24 | End: 2021-04-01

## 2021-03-04 ENCOUNTER — HOSPITAL ENCOUNTER (OUTPATIENT)
Dept: GENERAL RADIOLOGY | Age: 56
Discharge: HOME | End: 2021-03-04
Attending: INTERNAL MEDICINE
Payer: COMMERCIAL

## 2021-03-04 DIAGNOSIS — R13.19 ESOPHAGEAL DYSPHAGIA: ICD-10-CM

## 2021-03-04 PROCEDURE — 74220 X-RAY XM ESOPHAGUS 1CNTRST: CPT

## 2021-04-01 RX ORDER — PANTOPRAZOLE SODIUM 20 MG/1
TABLET, DELAYED RELEASE ORAL
Qty: 30 TAB | Refills: 0 | Status: SHIPPED | OUTPATIENT
Start: 2021-04-01 | End: 2021-09-28

## 2021-08-04 DIAGNOSIS — F51.01 PRIMARY INSOMNIA: ICD-10-CM

## 2021-08-05 RX ORDER — TEMAZEPAM 22.5 MG/1
CAPSULE ORAL
Qty: 90 CAPSULE | Refills: 0 | Status: SHIPPED | OUTPATIENT
Start: 2021-08-05 | End: 2021-08-17 | Stop reason: CLARIF

## 2021-08-05 RX ORDER — RIZATRIPTAN BENZOATE 10 MG/1
TABLET ORAL
Qty: 6 TABLET | Refills: 1 | Status: SHIPPED | OUTPATIENT
Start: 2021-08-05 | End: 2021-08-17 | Stop reason: CLARIF

## 2021-08-11 RX ORDER — TEMAZEPAM 22.5 MG/1
CAPSULE ORAL
Qty: 90 CAPSULE | Refills: 0 | OUTPATIENT
Start: 2021-08-11

## 2021-08-11 NOTE — TELEPHONE ENCOUNTER
----- Message from Kyra Torres sent at 8/11/2021  9:36 AM EDT -----  Regarding: Dr Genoveva Gracia (if not patient):pt      Relationship of caller (if not patient):pt      Best contact number(s):[phone]      Name of medication and dosage if known:Temazepam 22.5 mg 90 day supply      Is patient out of this medication (yes/no):yes      Pharmacy name:[place] [id number] listed in chart? (yes/no): yes  Pharmacy phone number:? Details to clarify the request: Pt is requesting a refill for Temazepam 22.5 mg to be called to the pharmacy on file.        Message from [facility]

## 2021-08-17 RX ORDER — TEMAZEPAM 22.5 MG/1
CAPSULE ORAL
Qty: 90 CAPSULE | Refills: 0 | Status: CANCELLED | OUTPATIENT
Start: 2021-08-17

## 2021-08-17 NOTE — TELEPHONE ENCOUNTER
Medication was sent to Mail Order on 08/05/21. Called, spoke with Pt. Two pt identifiers confirmed. Pt informed per [facility] edis, a PA is needed for the Temazepam.   Pt informed I will work on it today for her. Pt verbalized understanding of information discussed w/ no further questions at this time.

## 2021-08-17 NOTE — TELEPHONE ENCOUNTER
#[phone]  Pt states she must get this medication as she has not slept in two weeks. Pt states she was seen in Feb and does not need an appt she didn't think. I advised that doctor wanted her to schedule cpe and pt states she has been seen and needs the medication as soon as possible.      90 day mail order [facility]

## 2021-08-18 DIAGNOSIS — F51.01 PRIMARY INSOMNIA: Primary | ICD-10-CM

## 2021-08-18 RX ORDER — TEMAZEPAM 30 MG/1
CAPSULE ORAL
COMMUNITY
End: 2021-08-18 | Stop reason: SDUPTHER

## 2021-08-18 RX ORDER — TEMAZEPAM 30 MG/1
30 CAPSULE ORAL
Qty: 90 CAPSULE | Refills: 0 | Status: SHIPPED | OUTPATIENT
Start: 2021-08-18 | End: 2021-08-24 | Stop reason: SDUPTHER

## 2021-08-18 NOTE — TELEPHONE ENCOUNTER
----- Message from Dallas Choi sent at 8/18/2021  8:37 AM EDT -----  Regarding: /Telephone  Medication Refill    Caller (if not patient): NA      Relationship of caller (if not patient): Self       Best contact number(s): [phone]      Name of medication and dosage if known: Temazepam 22.5 mg 90 day supply      Is patient out of this medication (yes/no): Yes      Pharmacy name: [facility] listed in chart? (yes/no): Yes  Pharmacy phone number: NA      Details to clarify the request: Patient advising spoken with [facility] several days and still advising they haven't received request. Please call patient to advise if order can be resent so patent can be sure to have refill.        Message from [facility]

## 2021-08-18 NOTE — TELEPHONE ENCOUNTER
Called, spoke with Pt. Two pt identifiers confirmed. Pt informed Rx was sent on 08/05/21 & PA was done and they just need to fill it. Pt stated she talked to [address] [place] [address] and they are requesting a new Rx. Pt informed I will send request to Dr. Michael Schilling to have it filled again. Pt verbalized understanding of information discussed w/ no further questions at this time.

## 2021-08-24 DIAGNOSIS — F51.01 PRIMARY INSOMNIA: ICD-10-CM

## 2021-08-24 RX ORDER — TEMAZEPAM 30 MG/1
30 CAPSULE ORAL
Qty: 90 CAPSULE | Refills: 0 | Status: SHIPPED | OUTPATIENT
Start: 2021-08-24 | End: 2021-09-22

## 2021-08-24 NOTE — TELEPHONE ENCOUNTER
Patient states she is requesting refill for 90 day supply as patient will be out before upcoming CPE appt scheduled for 9/28/21. Please call if any questions.  Thank you

## 2021-08-24 NOTE — TELEPHONE ENCOUNTER
PCP: Donta Gaming MD    Last appt: 2/24/2021  Future Appointments   Date Time Provider Emily Rivas   9/28/2021  8:30 AM Donta Gaming MD [facility] BS AMB       Requested Prescriptions     Pending Prescriptions Disp Refills    temazepam (RESTORIL) 30 mg capsule 90 Capsule 0     Sig: Take 1 Capsule by mouth nightly. Max Daily Amount: 30 mg.

## 2021-09-21 DIAGNOSIS — F51.01 PRIMARY INSOMNIA: ICD-10-CM

## 2021-09-22 DIAGNOSIS — F51.01 PRIMARY INSOMNIA: ICD-10-CM

## 2021-09-22 RX ORDER — TEMAZEPAM 30 MG/1
CAPSULE ORAL
Qty: 30 CAPSULE | Refills: 0 | Status: CANCELLED | OUTPATIENT
Start: 2021-09-22

## 2021-09-22 RX ORDER — TEMAZEPAM 30 MG/1
CAPSULE ORAL
Qty: 30 CAPSULE | Refills: 0 | Status: SHIPPED | OUTPATIENT
Start: 2021-09-22 | End: 2021-09-23

## 2021-09-22 NOTE — TELEPHONE ENCOUNTER
----- Message from [facility] sent at 9/22/2021  3:36 PM EDT -----  Regarding: /Telephone  Contact: [phone]  General Message/Vendor Calls    Caller's first and last name: Ramu Elise      Reason for call:  needs refill of 22.5mg for sleeping medication      Callback required yes/no and why: yes    Best contact number(s):[phone]        Details to clarify the request: prescription was previously 22.5 mg but was changed to 30 mg, needs clarification for increase in dosage  If not, needs 22.5 mg refill resent to [facility] [address] for 90 day suppy because there is a pre-approval on file   [facility] [employer] [facility] [phone]        Message from [facility]

## 2021-09-23 RX ORDER — TEMAZEPAM 22.5 MG/1
22.5 CAPSULE ORAL
Qty: 90 CAPSULE | Refills: 1 | Status: SHIPPED | OUTPATIENT
Start: 2021-09-23 | End: 2022-03-23 | Stop reason: SDUPTHER

## 2021-09-23 NOTE — TELEPHONE ENCOUNTER
Called, spoke with Pt. Two pt identifiers confirmed. Pt asked which dose does she like better 22.5mg or 30mg that helps her sleep. Pt stated the 22.5mg is fine and if the 30mg, she will need a PA and doesn't feel a difference with the higher dose. Pt informed I will send the 22.5mg refill request to Dr. Domitila Sanders. Pt verbalized understanding of information discussed w/ no further questions at this time. PCP: Alma Martinez MD    Last appt: 2/24/2021  Future Appointments   Date Time Provider Emily Rivas   9/28/2021  8:30 AM Alma Martinez MD [facility] BS AMB       Requested Prescriptions     Pending Prescriptions Disp Refills    temazepam (RESTORIL) 22.5 mg capsule 90 Capsule 1     Sig: Take 1 Capsule by mouth nightly as needed for Sleep.  Max Daily Amount: 22.5 mg.
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
## 2021-09-28 ENCOUNTER — OFFICE VISIT (OUTPATIENT)
Dept: INTERNAL MEDICINE CLINIC | Age: 56
End: 2021-09-28
Payer: COMMERCIAL

## 2021-09-28 VITALS
SYSTOLIC BLOOD PRESSURE: 111 MMHG | HEART RATE: 56 BPM | HEIGHT: 68 IN | BODY MASS INDEX: 28.76 KG/M2 | RESPIRATION RATE: 16 BRPM | TEMPERATURE: 97.5 F | OXYGEN SATURATION: 97 % | DIASTOLIC BLOOD PRESSURE: 72 MMHG | WEIGHT: 189.8 LBS

## 2021-09-28 DIAGNOSIS — F51.01 PRIMARY INSOMNIA: Primary | ICD-10-CM

## 2021-09-28 DIAGNOSIS — G62.9 NEUROPATHY: ICD-10-CM

## 2021-09-28 DIAGNOSIS — K21.9 GASTROESOPHAGEAL REFLUX DISEASE WITHOUT ESOPHAGITIS: ICD-10-CM

## 2021-09-28 DIAGNOSIS — C44.311 BASAL CELL CARCINOMA (BCC) OF SKIN OF NOSE: ICD-10-CM

## 2021-09-28 DIAGNOSIS — Z23 NEEDS FLU SHOT: ICD-10-CM

## 2021-09-28 DIAGNOSIS — Z23 ENCOUNTER FOR IMMUNIZATION: ICD-10-CM

## 2021-09-28 DIAGNOSIS — Z11.59 ENCOUNTER FOR HEPATITIS C SCREENING TEST FOR LOW RISK PATIENT: ICD-10-CM

## 2021-09-28 LAB
ANION GAP SERPL CALC-SCNC: 4 MMOL/L (ref 5–15)
BUN SERPL-MCNC: 15 MG/DL (ref 6–20)
BUN/CREAT SERPL: 16 (ref 12–20)
CALCIUM SERPL-MCNC: 8.7 MG/DL (ref 8.5–10.1)
CHLORIDE SERPL-SCNC: 107 MMOL/L (ref 97–108)
CO2 SERPL-SCNC: 27 MMOL/L (ref 21–32)
CREAT SERPL-MCNC: 0.91 MG/DL (ref 0.55–1.02)
GLUCOSE SERPL-MCNC: 87 MG/DL (ref 65–100)
HCV AB SERPL QL IA: NONREACTIVE
POTASSIUM SERPL-SCNC: 4.5 MMOL/L (ref 3.5–5.1)
SODIUM SERPL-SCNC: 138 MMOL/L (ref 136–145)

## 2021-09-28 PROCEDURE — 90750 HZV VACC RECOMBINANT IM: CPT | Performed by: INTERNAL MEDICINE

## 2021-09-28 PROCEDURE — 99213 OFFICE O/P EST LOW 20 MIN: CPT | Performed by: INTERNAL MEDICINE

## 2021-09-28 PROCEDURE — 90472 IMMUNIZATION ADMIN EACH ADD: CPT | Performed by: INTERNAL MEDICINE

## 2021-09-28 PROCEDURE — 90686 IIV4 VACC NO PRSV 0.5 ML IM: CPT | Performed by: INTERNAL MEDICINE

## 2021-09-28 PROCEDURE — 90471 IMMUNIZATION ADMIN: CPT | Performed by: INTERNAL MEDICINE

## 2021-09-28 RX ORDER — ESTRADIOL 0.05 MG/D
PATCH, EXTENDED RELEASE TRANSDERMAL
COMMUNITY
Start: 2021-07-15 | End: 2022-03-29

## 2021-11-30 ENCOUNTER — TRANSCRIBE ORDER (OUTPATIENT)
Dept: REGISTRATION | Age: 56
End: 2021-11-30

## 2021-11-30 DIAGNOSIS — Z01.812 PRE-PROCEDURE LAB EXAM: Primary | ICD-10-CM

## 2021-12-06 ENCOUNTER — HOSPITAL ENCOUNTER (OUTPATIENT)
Dept: PREADMISSION TESTING | Age: 56
Discharge: HOME | End: 2021-12-06
Attending: SPECIALIST
Payer: COMMERCIAL

## 2021-12-06 DIAGNOSIS — Z01.812 PRE-PROCEDURE LAB EXAM: ICD-10-CM

## 2021-12-06 PROCEDURE — U0005 INFEC AGEN DETEC AMPLI PROBE: HCPCS

## 2021-12-08 ENCOUNTER — ANESTHESIA (OUTPATIENT)
Dept: SURGERY | Age: 56
End: 2021-12-08
Payer: COMMERCIAL

## 2021-12-08 ENCOUNTER — ANESTHESIA EVENT (OUTPATIENT)
Dept: SURGERY | Age: 56
End: 2021-12-08
Payer: COMMERCIAL

## 2021-12-08 ENCOUNTER — HOSPITAL ENCOUNTER (OUTPATIENT)
Age: 56
Setting detail: OUTPATIENT SURGERY
Discharge: HOME | End: 2021-12-08
Attending: SPECIALIST | Admitting: SPECIALIST
Payer: COMMERCIAL

## 2021-12-08 VITALS
DIASTOLIC BLOOD PRESSURE: 79 MMHG | WEIGHT: 180 LBS | TEMPERATURE: 97.5 F | BODY MASS INDEX: 27.37 KG/M2 | OXYGEN SATURATION: 97 % | RESPIRATION RATE: 15 BRPM | HEART RATE: 57 BPM | SYSTOLIC BLOOD PRESSURE: 112 MMHG

## 2021-12-08 DIAGNOSIS — R52 PAIN: ICD-10-CM

## 2021-12-08 DIAGNOSIS — D21.9 FIBROID: Primary | ICD-10-CM

## 2021-12-08 LAB
HCG UR QL: NEGATIVE
SARS-COV-2, XPLCVT: NOT DETECTED
SOURCE, COVRS: NORMAL

## 2021-12-08 PROCEDURE — 77030002895 HC DEV VASC CLOSR COVD -B: Performed by: SPECIALIST

## 2021-12-08 PROCEDURE — 77030031492 HC PRT ACC BLNT AIRSEAL CNMD -B: Performed by: SPECIALIST

## 2021-12-08 PROCEDURE — 77030039147 HC PWDR HEMSTS SURGICEL JNJ -D: Performed by: SPECIALIST

## 2021-12-08 PROCEDURE — 74011000250 HC RX REV CODE- 250: Performed by: ANESTHESIOLOGY

## 2021-12-08 PROCEDURE — 74011000250 HC RX REV CODE- 250: Performed by: SPECIALIST

## 2021-12-08 PROCEDURE — 88307 TISSUE EXAM BY PATHOLOGIST: CPT

## 2021-12-08 PROCEDURE — 77030041523 HC SEALNT FIBRN VITASEAL J&J -E: Performed by: SPECIALIST

## 2021-12-08 PROCEDURE — 2709999900 HC NON-CHARGEABLE SUPPLY: Performed by: SPECIALIST

## 2021-12-08 PROCEDURE — 81025 URINE PREGNANCY TEST: CPT

## 2021-12-08 PROCEDURE — 77030028402 HC SYS LAPSC TISS RETRV AMR -B: Performed by: SPECIALIST

## 2021-12-08 PROCEDURE — 77030013079 HC BLNKT BAIR HGGR 3M -A: Performed by: ANESTHESIOLOGY

## 2021-12-08 PROCEDURE — 74011250636 HC RX REV CODE- 250/636: Performed by: ANESTHESIOLOGY

## 2021-12-08 PROCEDURE — 77030031139 HC SUT VCRL2 J&J -A: Performed by: SPECIALIST

## 2021-12-08 PROCEDURE — 77030026243 HC MANIP UTER VCAR LSIS -B: Performed by: SPECIALIST

## 2021-12-08 PROCEDURE — 74011250636 HC RX REV CODE- 250/636

## 2021-12-08 PROCEDURE — 74011000258 HC RX REV CODE- 258: Performed by: ANESTHESIOLOGY

## 2021-12-08 PROCEDURE — 77030026438 HC STYL ET INTUB CARD -A: Performed by: ANESTHESIOLOGY

## 2021-12-08 PROCEDURE — 77030039895 HC SYST SMK EVAC LAP COVD -B: Performed by: SPECIALIST

## 2021-12-08 PROCEDURE — C1782 MORCELLATOR: HCPCS | Performed by: SPECIALIST

## 2021-12-08 PROCEDURE — 76210000020 HC REC RM PH II FIRST 0.5 HR: Performed by: SPECIALIST

## 2021-12-08 PROCEDURE — 77030020703 HC SEAL CANN DISP INTU -B: Performed by: SPECIALIST

## 2021-12-08 PROCEDURE — 77030002933 HC SUT MCRYL J&J -A: Performed by: SPECIALIST

## 2021-12-08 PROCEDURE — 76210000006 HC OR PH I REC 0.5 TO 1 HR: Performed by: SPECIALIST

## 2021-12-08 PROCEDURE — 74011250636 HC RX REV CODE- 250/636: Performed by: SPECIALIST

## 2021-12-08 PROCEDURE — 74011250637 HC RX REV CODE- 250/637

## 2021-12-08 PROCEDURE — 77030041236 HC APPL SURG ENDO JNJ -B: Performed by: SPECIALIST

## 2021-12-08 PROCEDURE — 77030034133 HC APPL ENDOSCP FLX SPRY BAXT -C: Performed by: SPECIALIST

## 2021-12-08 PROCEDURE — 77030035277 HC OBTRTR BLDELSS DISP INTU -B: Performed by: SPECIALIST

## 2021-12-08 PROCEDURE — 76060000034 HC ANESTHESIA 1.5 TO 2 HR: Performed by: SPECIALIST

## 2021-12-08 PROCEDURE — 74011000250 HC RX REV CODE- 250

## 2021-12-08 PROCEDURE — 76010000875 HC OR TIME 1.5 TO 2HR INTENSV - TIER 2: Performed by: SPECIALIST

## 2021-12-08 PROCEDURE — 77030008684 HC TU ET CUF COVD -B: Performed by: ANESTHESIOLOGY

## 2021-12-08 PROCEDURE — 77030003578 HC NDL INSUF VERES AMR -B: Performed by: SPECIALIST

## 2021-12-08 PROCEDURE — 74011250637 HC RX REV CODE- 250/637: Performed by: ANESTHESIOLOGY

## 2021-12-08 RX ORDER — MIDAZOLAM HYDROCHLORIDE 1 MG/ML
INJECTION, SOLUTION INTRAMUSCULAR; INTRAVENOUS AS NEEDED
Status: DISCONTINUED | OUTPATIENT
Start: 2021-12-08 | End: 2021-12-08 | Stop reason: HOSPADM

## 2021-12-08 RX ORDER — MORPHINE SULFATE 2 MG/ML
2 INJECTION, SOLUTION INTRAMUSCULAR; INTRAVENOUS
Status: DISCONTINUED | OUTPATIENT
Start: 2021-12-08 | End: 2021-12-08 | Stop reason: HOSPADM

## 2021-12-08 RX ORDER — MIDAZOLAM HYDROCHLORIDE 1 MG/ML
1 INJECTION, SOLUTION INTRAMUSCULAR; INTRAVENOUS AS NEEDED
Status: DISCONTINUED | OUTPATIENT
Start: 2021-12-08 | End: 2021-12-08 | Stop reason: HOSPADM

## 2021-12-08 RX ORDER — HYDROMORPHONE HYDROCHLORIDE 1 MG/ML
0.2 INJECTION, SOLUTION INTRAMUSCULAR; INTRAVENOUS; SUBCUTANEOUS
Status: DISCONTINUED | OUTPATIENT
Start: 2021-12-08 | End: 2021-12-08 | Stop reason: HOSPADM

## 2021-12-08 RX ORDER — DOCUSATE SODIUM 100 MG/1
100 CAPSULE, LIQUID FILLED ORAL 2 TIMES DAILY
Qty: 60 CAPSULE | Refills: 2 | Status: SHIPPED | OUTPATIENT
Start: 2021-12-08 | End: 2022-03-08

## 2021-12-08 RX ORDER — OXYCODONE AND ACETAMINOPHEN 5; 325 MG/1; MG/1
1 TABLET ORAL
Qty: 20 TABLET | Refills: 0 | Status: SHIPPED | OUTPATIENT
Start: 2021-12-08 | End: 2021-12-13

## 2021-12-08 RX ORDER — LIDOCAINE HYDROCHLORIDE 10 MG/ML
0.1 INJECTION, SOLUTION EPIDURAL; INFILTRATION; INTRACAUDAL; PERINEURAL AS NEEDED
Status: DISCONTINUED | OUTPATIENT
Start: 2021-12-08 | End: 2021-12-08 | Stop reason: HOSPADM

## 2021-12-08 RX ORDER — ROCURONIUM BROMIDE 10 MG/ML
INJECTION, SOLUTION INTRAVENOUS AS NEEDED
Status: DISCONTINUED | OUTPATIENT
Start: 2021-12-08 | End: 2021-12-08 | Stop reason: HOSPADM

## 2021-12-08 RX ORDER — METRONIDAZOLE 500 MG/100ML
INJECTION, SOLUTION INTRAVENOUS AS NEEDED
Status: DISCONTINUED | OUTPATIENT
Start: 2021-12-08 | End: 2021-12-08 | Stop reason: HOSPADM

## 2021-12-08 RX ORDER — SODIUM CHLORIDE 0.9 % (FLUSH) 0.9 %
5-40 SYRINGE (ML) INJECTION EVERY 8 HOURS
Status: DISCONTINUED | OUTPATIENT
Start: 2021-12-08 | End: 2021-12-08 | Stop reason: HOSPADM

## 2021-12-08 RX ORDER — ONDANSETRON 2 MG/ML
INJECTION INTRAMUSCULAR; INTRAVENOUS AS NEEDED
Status: DISCONTINUED | OUTPATIENT
Start: 2021-12-08 | End: 2021-12-08 | Stop reason: HOSPADM

## 2021-12-08 RX ORDER — FENTANYL CITRATE 50 UG/ML
INJECTION, SOLUTION INTRAMUSCULAR; INTRAVENOUS AS NEEDED
Status: DISCONTINUED | OUTPATIENT
Start: 2021-12-08 | End: 2021-12-08 | Stop reason: HOSPADM

## 2021-12-08 RX ORDER — HYDROCODONE BITARTRATE AND ACETAMINOPHEN 5; 300 MG/1; MG/1
1 TABLET ORAL
Qty: 20 TABLET | Refills: 0 | Status: SHIPPED | OUTPATIENT
Start: 2021-12-08 | End: 2021-12-11

## 2021-12-08 RX ORDER — ONDANSETRON 2 MG/ML
4 INJECTION INTRAMUSCULAR; INTRAVENOUS AS NEEDED
Status: DISCONTINUED | OUTPATIENT
Start: 2021-12-08 | End: 2021-12-08 | Stop reason: HOSPADM

## 2021-12-08 RX ORDER — HYDROMORPHONE HYDROCHLORIDE 2 MG/ML
INJECTION, SOLUTION INTRAMUSCULAR; INTRAVENOUS; SUBCUTANEOUS AS NEEDED
Status: DISCONTINUED | OUTPATIENT
Start: 2021-12-08 | End: 2021-12-08 | Stop reason: HOSPADM

## 2021-12-08 RX ORDER — SODIUM CHLORIDE, SODIUM LACTATE, POTASSIUM CHLORIDE, CALCIUM CHLORIDE 600; 310; 30; 20 MG/100ML; MG/100ML; MG/100ML; MG/100ML
125 INJECTION, SOLUTION INTRAVENOUS CONTINUOUS
Status: DISCONTINUED | OUTPATIENT
Start: 2021-12-08 | End: 2021-12-08 | Stop reason: HOSPADM

## 2021-12-08 RX ORDER — EPHEDRINE SULFATE/0.9% NACL/PF 50 MG/5 ML
5 SYRINGE (ML) INTRAVENOUS AS NEEDED
Status: DISCONTINUED | OUTPATIENT
Start: 2021-12-08 | End: 2021-12-08 | Stop reason: HOSPADM

## 2021-12-08 RX ORDER — ENOXAPARIN SODIUM 100 MG/ML
40 INJECTION SUBCUTANEOUS ONCE
Status: COMPLETED | OUTPATIENT
Start: 2021-12-08 | End: 2021-12-08

## 2021-12-08 RX ORDER — SODIUM CHLORIDE 9 MG/ML
50 INJECTION, SOLUTION INTRAVENOUS CONTINUOUS
Status: DISCONTINUED | OUTPATIENT
Start: 2021-12-08 | End: 2021-12-08 | Stop reason: HOSPADM

## 2021-12-08 RX ORDER — BUPIVACAINE HYDROCHLORIDE 5 MG/ML
INJECTION, SOLUTION EPIDURAL; INTRACAUDAL AS NEEDED
Status: DISCONTINUED | OUTPATIENT
Start: 2021-12-08 | End: 2021-12-08 | Stop reason: HOSPADM

## 2021-12-08 RX ORDER — DIPHENHYDRAMINE HYDROCHLORIDE 50 MG/ML
12.5 INJECTION, SOLUTION INTRAMUSCULAR; INTRAVENOUS AS NEEDED
Status: DISCONTINUED | OUTPATIENT
Start: 2021-12-08 | End: 2021-12-08 | Stop reason: HOSPADM

## 2021-12-08 RX ORDER — OXYCODONE AND ACETAMINOPHEN 5; 325 MG/1; MG/1
1 TABLET ORAL AS NEEDED
Status: DISCONTINUED | OUTPATIENT
Start: 2021-12-08 | End: 2021-12-08 | Stop reason: HOSPADM

## 2021-12-08 RX ORDER — SODIUM CHLORIDE 0.9 % (FLUSH) 0.9 %
5-40 SYRINGE (ML) INJECTION AS NEEDED
Status: DISCONTINUED | OUTPATIENT
Start: 2021-12-08 | End: 2021-12-08 | Stop reason: HOSPADM

## 2021-12-08 RX ORDER — PROPOFOL 10 MG/ML
INJECTION, EMULSION INTRAVENOUS AS NEEDED
Status: DISCONTINUED | OUTPATIENT
Start: 2021-12-08 | End: 2021-12-08 | Stop reason: HOSPADM

## 2021-12-08 RX ORDER — FENTANYL CITRATE 50 UG/ML
25 INJECTION, SOLUTION INTRAMUSCULAR; INTRAVENOUS
Status: DISCONTINUED | OUTPATIENT
Start: 2021-12-08 | End: 2021-12-08 | Stop reason: HOSPADM

## 2021-12-08 RX ORDER — EPHEDRINE SULFATE/0.9% NACL/PF 50 MG/5 ML
SYRINGE (ML) INTRAVENOUS AS NEEDED
Status: DISCONTINUED | OUTPATIENT
Start: 2021-12-08 | End: 2021-12-08 | Stop reason: HOSPADM

## 2021-12-08 RX ORDER — METRONIDAZOLE 500 MG/100ML
500 INJECTION, SOLUTION INTRAVENOUS ONCE
Status: DISCONTINUED | OUTPATIENT
Start: 2021-12-08 | End: 2021-12-08 | Stop reason: HOSPADM

## 2021-12-08 RX ORDER — MIDAZOLAM HYDROCHLORIDE 1 MG/ML
0.5 INJECTION, SOLUTION INTRAMUSCULAR; INTRAVENOUS
Status: DISCONTINUED | OUTPATIENT
Start: 2021-12-08 | End: 2021-12-08 | Stop reason: HOSPADM

## 2021-12-08 RX ORDER — FENTANYL CITRATE 50 UG/ML
50 INJECTION, SOLUTION INTRAMUSCULAR; INTRAVENOUS AS NEEDED
Status: DISCONTINUED | OUTPATIENT
Start: 2021-12-08 | End: 2021-12-08 | Stop reason: HOSPADM

## 2021-12-08 RX ORDER — LIDOCAINE HYDROCHLORIDE 20 MG/ML
INJECTION, SOLUTION EPIDURAL; INFILTRATION; INTRACAUDAL; PERINEURAL AS NEEDED
Status: DISCONTINUED | OUTPATIENT
Start: 2021-12-08 | End: 2021-12-08 | Stop reason: HOSPADM

## 2021-12-08 RX ORDER — SUCCINYLCHOLINE CHLORIDE 20 MG/ML
INJECTION INTRAMUSCULAR; INTRAVENOUS AS NEEDED
Status: DISCONTINUED | OUTPATIENT
Start: 2021-12-08 | End: 2021-12-08 | Stop reason: HOSPADM

## 2021-12-08 RX ORDER — DEXAMETHASONE SODIUM PHOSPHATE 4 MG/ML
INJECTION, SOLUTION INTRA-ARTICULAR; INTRALESIONAL; INTRAMUSCULAR; INTRAVENOUS; SOFT TISSUE AS NEEDED
Status: DISCONTINUED | OUTPATIENT
Start: 2021-12-08 | End: 2021-12-08 | Stop reason: HOSPADM

## 2021-12-08 RX ORDER — ACETAMINOPHEN 325 MG/1
650 TABLET ORAL ONCE
Status: COMPLETED | OUTPATIENT
Start: 2021-12-08 | End: 2021-12-08

## 2021-12-08 RX ORDER — SODIUM CHLORIDE 9 MG/ML
1000 INJECTION, SOLUTION INTRAVENOUS CONTINUOUS
Status: DISCONTINUED | OUTPATIENT
Start: 2021-12-08 | End: 2021-12-08 | Stop reason: HOSPADM

## 2021-12-08 RX ORDER — SCOLOPAMINE TRANSDERMAL SYSTEM 1 MG/1
PATCH, EXTENDED RELEASE TRANSDERMAL AS NEEDED
Status: DISCONTINUED | OUTPATIENT
Start: 2021-12-08 | End: 2021-12-08 | Stop reason: HOSPADM

## 2021-12-08 RX ADMIN — WATER 2 G: 1 INJECTION INTRAMUSCULAR; INTRAVENOUS; SUBCUTANEOUS at 07:41

## 2021-12-08 RX ADMIN — DEXAMETHASONE SODIUM PHOSPHATE 4 MG: 4 INJECTION, SOLUTION INTRAMUSCULAR; INTRAVENOUS at 07:57

## 2021-12-08 RX ADMIN — METRONIDAZOLE 500 MG: 500 SOLUTION INTRAVENOUS at 07:41

## 2021-12-08 RX ADMIN — DEXMEDETOMIDINE HYDROCHLORIDE 8 MCG: 100 INJECTION, SOLUTION, CONCENTRATE INTRAVENOUS at 08:00

## 2021-12-08 RX ADMIN — MIDAZOLAM 2 MG: 1 INJECTION INTRAMUSCULAR; INTRAVENOUS at 07:22

## 2021-12-08 RX ADMIN — ROCURONIUM BROMIDE 40 MG: 10 SOLUTION INTRAVENOUS at 07:41

## 2021-12-08 RX ADMIN — HYDROMORPHONE HYDROCHLORIDE 0.5 MG: 2 INJECTION, SOLUTION INTRAMUSCULAR; INTRAVENOUS; SUBCUTANEOUS at 08:42

## 2021-12-08 RX ADMIN — Medication 10 MG: at 07:44

## 2021-12-08 RX ADMIN — PROPOFOL 200 MG: 10 INJECTION, EMULSION INTRAVENOUS at 07:32

## 2021-12-08 RX ADMIN — LIDOCAINE HYDROCHLORIDE 60 MG: 20 INJECTION, SOLUTION EPIDURAL; INFILTRATION; INTRACAUDAL; PERINEURAL at 07:32

## 2021-12-08 RX ADMIN — SODIUM CHLORIDE, POTASSIUM CHLORIDE, SODIUM LACTATE AND CALCIUM CHLORIDE 125 ML/HR: 600; 310; 30; 20 INJECTION, SOLUTION INTRAVENOUS at 06:41

## 2021-12-08 RX ADMIN — SUCCINYLCHOLINE CHLORIDE 160 MG: 20 INJECTION, SOLUTION INTRAMUSCULAR; INTRAVENOUS at 07:33

## 2021-12-08 RX ADMIN — ACETAMINOPHEN 650 MG: 325 TABLET ORAL at 06:26

## 2021-12-08 RX ADMIN — ENOXAPARIN SODIUM 40 MG: 100 INJECTION SUBCUTANEOUS at 06:27

## 2021-12-08 RX ADMIN — FENTANYL CITRATE 100 MCG: 50 INJECTION, SOLUTION INTRAMUSCULAR; INTRAVENOUS at 07:32

## 2021-12-08 RX ADMIN — ONDANSETRON HYDROCHLORIDE 4 MG: 2 INJECTION, SOLUTION INTRAMUSCULAR; INTRAVENOUS at 08:32

## 2021-12-08 RX ADMIN — SCOPALAMINE 1 PATCH: 1 PATCH, EXTENDED RELEASE TRANSDERMAL at 07:23

## 2021-12-08 RX ADMIN — HYDROMORPHONE HYDROCHLORIDE 0.5 MG: 2 INJECTION, SOLUTION INTRAMUSCULAR; INTRAVENOUS; SUBCUTANEOUS at 08:57

## 2021-12-08 RX ADMIN — FENTANYL CITRATE 25 MCG: 0.05 INJECTION, SOLUTION INTRAMUSCULAR; INTRAVENOUS at 09:31

## 2021-12-08 RX ADMIN — SUGAMMADEX 170 MG: 100 INJECTION, SOLUTION INTRAVENOUS at 08:50

## 2021-12-08 RX ADMIN — FENTANYL CITRATE 25 MCG: 0.05 INJECTION, SOLUTION INTRAMUSCULAR; INTRAVENOUS at 09:20

## 2021-12-08 RX ADMIN — Medication 10 MG: at 07:48

## 2021-12-08 RX ADMIN — ONDANSETRON HYDROCHLORIDE 4 MG: 2 SOLUTION INTRAMUSCULAR; INTRAVENOUS at 10:08

## 2021-12-08 RX ADMIN — OXYCODONE AND ACETAMINOPHEN 1 TABLET: 5; 325 TABLET ORAL at 10:22

## 2021-12-08 NOTE — ANESTHESIA PREPROCEDURE EVALUATION
Relevant Problems   PERSONAL HX & FAMILY HX OF CANCER   (+) BCC (basal cell carcinoma of skin)       Anesthetic History     PONV          Review of Systems / Medical History  Patient summary reviewed, nursing notes reviewed and pertinent labs reviewed    Pulmonary  Within defined limits                 Neuro/Psych   Within defined limits           Cardiovascular  Within defined limits                     GI/Hepatic/Renal  Within defined limits              Endo/Other  Within defined limits           Other Findings              Physical Exam    Airway  Mallampati: II  TM Distance: > 6 cm  Neck ROM: normal range of motion   Mouth opening: Normal     Cardiovascular  Regular rate and rhythm,  S1 and S2 normal,  no murmur, click, rub, or gallop             Dental  No notable dental hx       Pulmonary  Breath sounds clear to auscultation               Abdominal  GI exam deferred       Other Findings            Anesthetic Plan    ASA: 2  Anesthesia type: general          Induction: Intravenous  Anesthetic plan and risks discussed with: Patient

## 2021-12-08 NOTE — ANESTHESIA POSTPROCEDURE EVALUATION
Procedure(s):  DAVINCI LAPAROSCOPIC SUPRACERVICAL HYSTERECTOMY, BILATERAL SALPINGECTOMY. general    Anesthesia Post Evaluation      Multimodal analgesia: multimodal analgesia used between 6 hours prior to anesthesia start to PACU discharge  Patient location during evaluation: PACU  Patient participation: complete - patient participated  Level of consciousness: awake  Pain score: 2  Pain management: adequate  Airway patency: patent  Anesthetic complications: no  Cardiovascular status: acceptable  Respiratory status: acceptable  Hydration status: acceptable  Comments: I have evaluated the patient and meets criteria for discharge from PACU. Fortunato Pugh MD  Post anesthesia nausea and vomiting:  controlled  Final Post Anesthesia Temperature Assessment:  Normothermia (36.0-37.5 degrees C)      INITIAL Post-op Vital signs:   Vitals Value Taken Time   /91 12/08/21 0915   Temp 36.6 °C (97.8 °F) 12/08/21 0858   Pulse 57 12/08/21 0929   Resp 13 12/08/21 0929   SpO2 100 % 12/08/21 0929   Vitals shown include unvalidated device data.

## 2021-12-08 NOTE — OP NOTES
[address] [place]   OPERATIVE REPORT    Name:  Juancarlos Vanegas  MR#:  [id number]  :  1965  ACCOUNT #:  [de-identified]  DATE OF SERVICE:  2021      PREOPERATIVE DIAGNOSES:  Postmenopausal bleeding, dyspareunia, pelvic pain. POSTOPERATIVE DIAGNOSES:  Postmenopausal bleeding, dyspareunia, pelvic pain. PROCEDURES PERFORMED:  Laparoscopic supracervical hysterectomy, lysis of adhesions, bilateral salpingectomy. SURGEON:  Yimi Fuller MD    ASSISTANT:       ANESTHESIA:  General.    COMPLICATIONS:  None. SPECIMENS REMOVED:       IMPLANTS:       ESTIMATED BLOOD LOSS:  Minimal.    DRAINS:  Shah catheter, clear urine. FINDINGS:  Omental adhesions on the left sidewall, small bowel adhesions on the left sidewall, minimal adhesions rectosigmoid, normal bilateral postmenopausal ovaries, cystic bilateral fallopian tubes. PATHOLOGY:  Morcellated uterus, bilateral fallopian tubes. DESCRIPTION OF PROCEDURE:  After extensive counseling, risks and benefits of the procedure, complication rates of the procedure, alternatives to procedure as well as careful discussion with the patient in regards to leiomyosarcoma with supracervical hysterectomy risk factors as well as risks and benefits of the supracervical versus total.  Consents were signed. She was taken to the operating room. After adequate anesthesia, she was placed in the dorsal lithotomy position, prepped and draped in the usual sterile manner for the surgical procedure. Preoperative antibiotics were given. SCDs, bowel prep and Lovenox had been given. Shah catheter was placed. Clear urine was noted. Sterile Graves speculum was inserted in vagina. Anterior lip of the cervix was grasped with a single-tooth tenaculum and a VCare diagnostic uterine manipulator was placed through the cervical canal for means of uterine manipulation.   Attention was then turned to the abdomen where a Veress needle was placed infraumbilically and confirmed with a water drop test.  Insufflation of CO2 up to 15 mmHg was then performed without complication. #11 scalpel blade was then used to make an infraumbilical incision and an 8-mm bladeless trocar and sleeve was inserted infraumbilically with the above-noted findings. 10 cm bilateral to the umbilical port site, 8-mm bladeless trocars and sleeves were inserted as well as one in the right lower quadrant port site. Under careful inspection, the da Erin intuitive robot was docked under the proper docking technique, fenestrated bipolar in the left hand, hot shreya in the right hand. The patient was placed in Trendelenburg. The uterus was anteverted and bilateral salpingectomy was performed with the fenestrated bipolar and hot shreya and removed through the right lower quadrant port site. After adhesiolysis for approximately 20 minutes, the uterus was clearly visualized. Bilateral tubo-ovarian ligaments were coagulated and transected with the hot shreya. Bladder flap was created with use of the hot shreya. Bilateral uterine vessels were then coagulated with the fenestrated bipolar and the uterus was turned to a blanching pale color. From a posterior to anterior approach right at the uterosacral ligaments, the uterus was transected from the cervix. The endocervical canal was coagulated with the fenestrated bipolar as well as the hot shreya. An Endobag was then placed into the pelvis, the uterus was placed into the EndoBag and under direct visualization, the uterus was morcellated through the Endobag without complication. Upon completion, the EndoBag was removed. Copious irrigation was performed. Vistaseal was sprayed over the cervical stump as well as surgi powder. The site was closed with 0 Vicryl interrupted x2.  8-mm sites 3-0 Vicryl interrupted x1. 0.25% Marcaine without was injected subcu. Dermabond was used on the skin. The patient tolerated the procedure well.   Needle, sponge and instrument count were correct x2 at the end of the procedure.       Valerie Troncoso MD      [id number]  D:  12/08/2021 8:56  T:  12/08/2021 12:19  JOB #:  [id number]

## 2021-12-08 NOTE — DISCHARGE INSTRUCTIONS
Laparoscopic Hysterectomy: What to Expect at [facility] 1808 laparoscopic hysterectomy is surgery to take out the uterus. Your doctor put a lighted tube and surgical tools through small cuts in your belly to remove the uterus. You can expect to feel better and stronger each day. But you might need pain medicine for a week or two. You may get tired easily or have less energy than usual. The tiredness may last for several weeks after surgery. You will probably notice that your belly is swollen and puffy. This is common. The swelling will take several weeks to go down. You may take about 4 to 6 weeks to fully recover. It's important to avoid lifting while you are recovering so that you can heal.  This care sheet gives you a general idea about how long it will take for you to recover. But each person recovers at a different pace. Follow the steps below to get better as quickly as possible. How can you care for yourself at home? Activity    · Rest when you feel tired.     · Be active. Walking is a good choice.     · Allow the area to heal. Don't move quickly or lift anything heavy until you are feeling better.     · You may shower 24 to 48 hours after surgery, if your doctor okays it. Pat the incision dry. Do not take a bath for the first 2 weeks, or until your doctor tells you it is okay.     · Ask your doctor when it is okay for you to have sex. Diet    · You can eat your normal diet. If your stomach is upset, try bland, low-fat foods like plain rice, broiled chicken, toast, and yogurt.     · If your bowel movements are not regular right after surgery, try to avoid constipation and straining. Drink plenty of water. Your doctor may suggest fiber, a stool softener, or a mild laxative. Medicines    · Your doctor will tell you if and when you can restart your medicines.  He or she will also give you instructions about taking any new medicines.     · If you take aspirin or some other blood thinner, ask your doctor if and when to start taking it again. Make sure that you understand exactly what your doctor wants you to do.     · Be safe with medicines. Read and follow all instructions on the label. ? If the doctor gave you a prescription medicine for pain, take it as prescribed. ? If you are not taking a prescription pain medicine, ask your doctor if you can take an over-the-counter medicine.     · If your doctor prescribed antibiotics, take them as directed. Do not stop taking them just because you feel better. You need to take the full course of antibiotics. Incision care    · You may have stitches over the cuts (incisions) the doctor made in your belly.     · If you have strips of tape on the cut (incision) the doctor made, leave the tape on for a week or until it falls off.     · Wash the area daily with warm, soapy water, and pat it dry. Don't use hydrogen peroxide or alcohol. They can slow healing.     · You may cover the area with a gauze bandage if it oozes fluid or rubs against clothing.     · Change the bandage every day. Other instructions    · You may have some light vaginal bleeding. Wear sanitary pads if needed. Do not douche or use tampons.     · Don't have sex until the doctor says it is okay. Follow-up care is a key part of your treatment and safety. Be sure to make and go to all appointments, and call your doctor if you are having problems. It's also a good idea to know your test results and keep a list of the medicines you take. When should you call for help? Call 911 anytime you think you may need emergency care. For example, call if:    · You passed out (lost consciousness).     · You have chest pain, are short of breath, or cough up blood.    Call your doctor now or seek immediate medical care if:    · You have pain that does not get better after you take pain medicine.     · You cannot pass stools or gas.     · You have vaginal discharge that has increased in amount or smells bad.     · You are sick to your stomach or cannot drink fluids.     · You have loose stitches, or your incision comes open.     · Bright red blood has soaked through the bandage over your incision.     · You have signs of infection, such as:  ? Increased pain, swelling, warmth, or redness. ? Red streaks leading from the incision. ? Pus draining from the incision. ? A fever.     · You have bright red vaginal bleeding that soaks one or more pads in an hour, or you have large clots.     · You have signs of a blood clot in your leg (called a deep vein thrombosis), such as:  ? Pain in your calf, back of the knee, thigh, or groin. ? Redness and swelling in your leg or groin. Watch closely for changes in your health, and be sure to contact your doctor if you have any problems. Where can you learn more? Go to http://www.gray.com/  Enter Q131 in the search box to learn more about \"Laparoscopic Hysterectomy: What to Expect at Home. \"  Current as of: February 11, 2021               Content Version: 13.0  © 5127-9127 [employer]. Care instructions adapted under license by [employer] (which disclaims liability or warranty for this information). If you have questions about a medical condition or this instruction, always ask your healthcare professional. [address] any warranty or liability for your use of this information. ______________________________________________________________________    Anesthesia Discharge Instructions    After general anesthesia or intervenous sedation, for 24 hours or while taking prescription Narcotics:  · Limit your activities  · Do not drive or operate hazardous machinery  · If you have not urinated within 8 hours after discharge, please contact your surgeon on call.   · Do not make important personal or business decisions  · Do not drink alcoholic beverages    Report the following to your surgeon:  · Excessive pain, swelling, redness or odor of or around the surgical area  · Temperature over 100.5 degrees  · Nausea and vomiting lasting longer than 4 hours or if unable to take medication  · Any signs of decreased circulation or nerve impairment to extremity:  Change in color, persistent numbness, tingling, coldness or increased pain.   · Any questions

## 2022-01-06 ENCOUNTER — OFFICE VISIT (OUTPATIENT)
Dept: ORTHOPEDIC SURGERY | Age: 57
End: 2022-01-06
Payer: COMMERCIAL

## 2022-01-06 VITALS — BODY MASS INDEX: 28.04 KG/M2 | WEIGHT: 185 LBS | HEIGHT: 68 IN

## 2022-01-06 DIAGNOSIS — M25.562 ACUTE PAIN OF LEFT KNEE: Primary | ICD-10-CM

## 2022-01-06 DIAGNOSIS — G89.29 CHRONIC MIDLINE LOW BACK PAIN WITH BILATERAL SCIATICA: ICD-10-CM

## 2022-01-06 DIAGNOSIS — M54.42 CHRONIC MIDLINE LOW BACK PAIN WITH BILATERAL SCIATICA: ICD-10-CM

## 2022-01-06 DIAGNOSIS — M54.41 CHRONIC MIDLINE LOW BACK PAIN WITH BILATERAL SCIATICA: ICD-10-CM

## 2022-01-06 PROCEDURE — 99214 OFFICE O/P EST MOD 30 MIN: CPT | Performed by: PHYSICIAN ASSISTANT

## 2022-01-06 RX ORDER — GABAPENTIN 300 MG/1
CAPSULE ORAL
Qty: 60 CAPSULE | Refills: 5 | Status: SHIPPED | OUTPATIENT
Start: 2022-01-06 | End: 2022-06-30 | Stop reason: SDUPTHER

## 2022-01-10 NOTE — PROGRESS NOTES
Kassy Figueroa (: 1965) is a 55 y.o. female patient here for evaluation of the following chief complaint(s):  Back Pain (medication management) and Knee Pain (left)         ASSESSMENT/PLAN:  Below is the assessment and plan developed based on review of pertinent history, physical exam, labs, studies, and medications. 1. Acute pain of left knee  -     XR KNEE LT MIN 4 V; Future  2. Chronic midline low back pain with bilateral sciatica  -     gabapentin (NEURONTIN) 300 mg capsule; Take one to two capsules at bedtime, Normal, Disp-60 Capsule, R-5      The patient's radiologic findings have been reviewed with her in detail today. She is doing fairly well from a back standpoint, she transitioned back onto gabapentin and is doing much better with this. She did have her running and activity restricted recently due to abdominal surgery, but will be resuming activity in the near future. She feels that she does better with continued movement and exercise. She did also have a slip and fall injury to her left knee earlier this week. She does have some swelling in the suprapatellar region. I recommended that she continue to rest, ice, elevate, and use compression to her knee. She will continue with Advil as needed. She may follow-up with one of our  if her symptoms continue beyond the next 5 to 7 days. Her gabapentin has been refilled as requested, and she will return for a routine follow-up and med management in 6 months. Return in about 6 months (around 2022) for Medication management. SUBJECTIVE/OBJECTIVE:  Kassy Figueroa (: 1965) is a 55 y.o. female who presents today for the following:  Chief Complaint   Patient presents with    Back Pain     medication management    Knee Pain     left        HPI   Ms. Figueroa presents today as a previous patient of the practice. She has a longstanding history of chronic low back pain with bilateral foot numbness.   She has no significant leg pain or weakness. She remains active with running until recently, which was restricted due to recent abdominal surgery. At her last office visit, she had a medication change to Lyrica, but felt that this provided her with no substantial relief. She switch back to gabapentin and continues doing well with symptom management with 1-2 tabs of gabapentin at bedtime. She does request a refill of this today. Incidentally, she slipped and fell on the ice earlier this week where she felt that she hyperextended her knee and landed on her bottom. Since that time, she has had left knee soreness and swelling. She has been able to ambulate on the left knee. No weakness or giving way. No bruising. She has been using ice and Advil with some relief of her symptoms. IMAGING:  XR Results (most recent):  Results from Appointment encounter on 01/06/22    XR KNEE LT MIN 4 V    Narrative  4 views of the left knee reveal no evidence of acute fracture or lytic lesion. No significant degenerative change noted. MRI Results (most recent):  No results found for this or any previous visit. No Known Allergies    Current Outpatient Medications   Medication Sig    gabapentin (NEURONTIN) 300 mg capsule Take one to two capsules at bedtime    docusate sodium (Colace) 100 mg capsule Take 1 Capsule by mouth two (2) times a day for 90 days.  docusate sodium (Colace) 100 mg capsule Take 1 Capsule by mouth two (2) times a day for 90 days.  Lyllana 0.05 mg/24 hr UNWRAP AND APPLY 1 PATCH TO SKIN TWICE A WEEK    temazepam (RESTORIL) 22.5 mg capsule Take 1 Capsule by mouth nightly as needed for Sleep. Max Daily Amount: 22.5 mg.  progesterone (PROMETRIUM) 100 mg capsule TK ONE C PO QD    ibuprofen (MOTRIN) 800 mg tablet TK 1 T PO Q 8 H PRN     No current facility-administered medications for this visit.        Past Medical History:   Diagnosis Date    Nausea & vomiting         Past Surgical History: Procedure Laterality Date    HX ORTHOPAEDIC      [facility] and shoulder surgery [facility] in [facility]       Family History   Problem Relation Age of Onset    Elevated Lipids Father         Social History     Tobacco Use    Smoking status: Never Smoker    Smokeless tobacco: Never Used   Substance Use Topics    Alcohol use: No        Review of Systems   Constitutional: Negative. Respiratory: Negative. Cardiovascular: Negative. Gastrointestinal: Negative. Endocrine: Negative. Genitourinary: Negative. Musculoskeletal: Positive for arthralgias and back pain. Skin: Negative. Allergic/Immunologic: Negative. Hematological: Negative. Psychiatric/Behavioral: Negative. All other systems reviewed and are negative. No flowsheet data found. Vitals:  Ht 5' 8\" (1.727 m)   Wt 185 lb (83.9 kg)   BMI 28.13 kg/m²    Body mass index is 28.13 kg/m². Physical Exam    Neurologic  Sensory  Light Touch - Intact - Globally. Overall Assessment of Muscle Strength and Tone reveals  Lower Extremities - Right Iliopsoas - 5/5. Left Iliopsoas - 5/5. Right Tibialis Anterior - 5/5. Left Tibialis Anterior - 5/5. Right Gastroc-Soleus - 5/5. Left Gastroc-Soleus - 5/5. Right EHL - 5/5. Left EHL - 5/5. General Assessment of Reflexes  Right Ankle - Clonus is not present. Left Ankle - Clonus is not present. Reflexes (Dermatomes)  2/2 Normal - Left Achilles (L5-S2), Left Knee (L2-4), Right Achilles (L5-S2) and Right Knee (L2-4). Musculoskeletal  Global Assessment  Examination of related systems reveals - well-developed, well-nourished, in no acute distress, alert and oriented x 3. Gait and Station - normal gait and station and normal posture. Right Lower Extremity - normal strength and tone, normal range of motion without pain and no instability, subluxation or laxity.  Left Lower Extremity - normal strength and tone, normal range of motion without pain and no instability, subluxation or laxity. Spine/Ribs/Pelvis  Cervical Spine - Examination of the cervical spine reveals - no tenderness to palpation, no pain, no swelling, edema or erythema, normal cervical spine movements and normal sensation. Thoracic (Dorsal) Spine - Examination of the thoracic spine reveals - no tenderness over thoracic vertebrae, no pain, normal sensation and normal thoracic spine movements. Lumbosacral Spine - Examination of the lumbosacral spine reveals - no known fractures or deformities. Inspection and Palpation - Tenderness - moderate. Assessment of pain reveals the following findings - The pain is characterized as - moderate. Location - pain refers to lower back bilaterally. ROJM - Trunk Extension - 15 degrees. Lumbar Spine Flexion - 35 °. Lumbosacral Spine - Functional Testing - Babinski Test negative, Prone Knee Bending Test negative, Slump Test negative, Straight Leg Raising Test negative. Examination of the left knee reveals some swelling in the suprapatellar region of the left knee. There is no point tenderness to palpation. No bruising noted no bony abnormalities. There is full range of motion of the left knee both actively and passively. No ligamentous instability x4. Dr. Sonia Forman was available for immediate consult during this encounter. An electronic signature was used to authenticate this note.   -- AUDI Toribio

## 2022-03-18 PROBLEM — F51.01 PRIMARY INSOMNIA: Status: ACTIVE | Noted: 2017-11-15

## 2022-03-19 PROBLEM — C44.91 BCC (BASAL CELL CARCINOMA OF SKIN): Status: ACTIVE | Noted: 2021-02-24

## 2022-03-21 ENCOUNTER — OFFICE VISIT (OUTPATIENT)
Dept: ORTHOPEDIC SURGERY | Age: 57
End: 2022-03-21
Payer: COMMERCIAL

## 2022-03-21 VITALS — BODY MASS INDEX: 27.28 KG/M2 | HEIGHT: 68 IN | WEIGHT: 180 LBS

## 2022-03-21 DIAGNOSIS — M22.42 CHONDROMALACIA OF LEFT PATELLA: Primary | ICD-10-CM

## 2022-03-21 DIAGNOSIS — S89.92XA PCL INJURY, LEFT, INITIAL ENCOUNTER: ICD-10-CM

## 2022-03-21 PROCEDURE — 99214 OFFICE O/P EST MOD 30 MIN: CPT | Performed by: ORTHOPAEDIC SURGERY

## 2022-03-21 RX ORDER — METHYLPREDNISOLONE 4 MG/1
TABLET ORAL
Qty: 1 DOSE PACK | Refills: 0 | Status: SHIPPED | OUTPATIENT
Start: 2022-03-21 | End: 2022-03-29 | Stop reason: ALTCHOICE

## 2022-03-21 NOTE — LETTER
3/21/2022    Patient: Farrah Figueroa   YOB: 1965   Date of Visit: 3/21/2022     Kasey Miramontes MD  [address]  [address]  [place]    Dear Kasey Miramontes MD,      Thank you for referring Ms. Kassy Figueroa to [facility] for evaluation. My notes for this consultation are attached. If you have questions, please do not hesitate to call me. I look forward to following your patient along with you.       Sincerely,    Monroe Mon, DO

## 2022-03-21 NOTE — PROGRESS NOTES
Kassy Figueroa (: 1965) is a 65 y.o. female, established patient, here for evaluation of the following chief complaint(s):  Knee Pain (left)       ASSESSMENT/PLAN:  Below is the assessment and plan developed based on review of pertinent history, physical exam, labs, studies, and medications. We will start with a Medrol Dosepak to decrease her acute inflammation. She does have some laxity with posterior drawer testing concerning for PCL injury. If she has continued pain despite anti-inflammatories home exercises and activity modifications and we will obtain an MRI which would help with further treatment plan including possible surgical treatment. 1. Chondromalacia of left patella  -     methylPREDNISolone (Medrol, Sandro,) 4 mg tablet; Use as directed, Normal, Disp-1 Dose Pack, R-0  2. PCL injury, left, initial encounter      Return in about 6 weeks (around 2022), or if symptoms worsen or fail to improve. SUBJECTIVE/OBJECTIVE:  Kassy Figueroa (: 1965) is a 65 y.o. female. She notes an injury that occurred in January when she slipped on ice. Since then she has tried ice, anti-inflammatories, and activity modifications. She saw one of our PAs and has tried home exercises as well. She notes that the injury was a hyperextension type injury and she did note some swelling afterwards. She notes aching pain at the anterior knee after she increases her activity. She has tried return to running. No Known Allergies    Current Outpatient Medications   Medication Sig    methylPREDNISolone (Medrol, Sandro,) 4 mg tablet Use as directed    gabapentin (NEURONTIN) 300 mg capsule Take one to two capsules at bedtime    temazepam (RESTORIL) 22.5 mg capsule Take 1 Capsule by mouth nightly as needed for Sleep.  Max Daily Amount: 22.5 mg.    ibuprofen (MOTRIN) 800 mg tablet TK 1 T PO Q 8 H PRN    Lyllana 0.05 mg/24 hr UNWRAP AND APPLY 1 PATCH TO SKIN TWICE A WEEK (Patient not taking: Reported on 3/21/2022)    progesterone (PROMETRIUM) 100 mg capsule TK ONE C PO QD (Patient not taking: Reported on 3/21/2022)     No current facility-administered medications for this visit. Social History     Socioeconomic History    Marital status:      Spouse name: Not on file    Number of children: Not on file    Years of education: Not on file    Highest education level: Not on file   Occupational History    Not on file   Tobacco Use    Smoking status: Never Smoker    Smokeless tobacco: Never Used   Substance and Sexual Activity    Alcohol use: No    Drug use: No    Sexual activity: Not on file   Other Topics Concern    Not on file   Social History Narrative    Not on file     Social Determinants of Health     Financial Resource Strain:     Difficulty of Paying Living Expenses: Not on file   Food Insecurity:     Worried About Running Out of Food in the Last Year: Not on file    Susana of Food in the Last Year: Not on file   Transportation Needs:     Lack of Transportation (Medical): Not on file    Lack of Transportation (Non-Medical):  Not on file   Physical Activity:     Days of Exercise per Week: Not on file    Minutes of Exercise per Session: Not on file   Stress:     Feeling of Stress : Not on file   Social Connections:     Frequency of Communication with Friends and Family: Not on file    Frequency of Social Gatherings with Friends and Family: Not on file    Attends [employer] Services: Not on file    Active Member of Clubs or Organizations: Not on file    Attends Club or Organization Meetings: Not on file    Marital Status: Not on file   Intimate Partner Violence:     Fear of Current or Ex-Partner: Not on file    Emotionally Abused: Not on file    Physically Abused: Not on file    Sexually Abused: Not on file   Housing Stability:     Unable to Pay for Housing in the Last Year: Not on file    Number of [place] in the Last Year: Not on file    Unstable Housing in the Last Year: Not on file       Past Surgical History:   Procedure Laterality Date    HX ORTHOPAEDIC      [facility] in [facility]       Family History   Problem Relation Age of Onset    Elevated Lipids Father         OB History    No obstetric history on file. REVIEW OF SYSTEMS:  ROS     Positive for: Musculoskeletal    Last edited by Rocklin Shone on 3/21/2022  8:07 AM. (History)        Patient denies any recent fever, chills, nausea, vomiting, chest pain, or shortness of breath. Vitals:  Ht 5' 8\" (1.727 m)   Wt 180 lb (81.6 kg)   BMI 27.37 kg/m²    Body mass index is 27.37 kg/m². PHYSICAL EXAM:  General exam: Patient is awake, alert, and oriented x3. Well-appearing. No acute distress. Ambulates with a normal gait. Left knee: There is some crepitus with range of motion. She has neurovascular and sensory intact. Tenderness to palpation in the parapatellar region noted. There is some laxity on posterior drawer testing. She has mild pain with Bonifacio's testing and mild tenderness palpation at the medial joint line. IMAGING:    XR Results (most recent):  Results from Appointment encounter on 01/06/22    XR KNEE LT MIN 4 V    Narrative  4 views of the left knee reveal no evidence of acute fracture or lytic lesion. No significant degenerative change noted. Results from Hospital Encounter encounter on 03/04/21    XR BA SWALLOW ESOPHOGRAM    Narrative  Clinical indication: Esophageal disc ectasia. Preliminary examination PA chest is unremarkable. Double contrast barium swallow  is performed. FLUOROSCOPY DOSE (air kerma): 32.99 mGy. The swallowing mechanism is intact, swallowing of the barium tablet did not show  any stasis. There are no stricture extravasation or filling defect. Impression  No significant abnormalities.          Orders Placed This Encounter    methylPREDNISolone (Medrol, Sandro,) 4 mg tablet     Sig: Use as directed     Dispense:  1 Dose Pack     Refill:  0              An electronic signature was used to authenticate this note.   -- Bishop Brandon, DO

## 2022-03-23 DIAGNOSIS — F51.01 PRIMARY INSOMNIA: ICD-10-CM

## 2022-03-25 NOTE — TELEPHONE ENCOUNTER
Future Appointments:  Future Appointments   Date Time Provider Emily Evelyn   6/30/2022  1:00 PM Mally Claire BS AMB        Last Appointment With Me:  9/28/2021     Requested Prescriptions     Pending Prescriptions Disp Refills    temazepam (RESTORIL) 22.5 mg capsule 90 Capsule 1     Sig: Take 1 Capsule by mouth nightly as needed for Sleep.  Max Daily Amount: 22.5 mg.

## 2022-03-26 RX ORDER — TEMAZEPAM 22.5 MG/1
22.5 CAPSULE ORAL
Qty: 90 CAPSULE | Refills: 0 | Status: SHIPPED | OUTPATIENT
Start: 2022-03-26 | End: 2022-03-29

## 2022-03-28 DIAGNOSIS — F51.01 PRIMARY INSOMNIA: ICD-10-CM
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
## 2022-03-29 ENCOUNTER — OFFICE VISIT (OUTPATIENT)
Dept: INTERNAL MEDICINE CLINIC | Age: 57
End: 2022-03-29
Payer: COMMERCIAL

## 2022-03-29 VITALS
SYSTOLIC BLOOD PRESSURE: 121 MMHG | BODY MASS INDEX: 28.61 KG/M2 | TEMPERATURE: 98.1 F | RESPIRATION RATE: 16 BRPM | WEIGHT: 188.8 LBS | DIASTOLIC BLOOD PRESSURE: 84 MMHG | HEART RATE: 55 BPM | HEIGHT: 68 IN | OXYGEN SATURATION: 98 %

## 2022-03-29 DIAGNOSIS — Z00.00 PHYSICAL EXAM: Primary | ICD-10-CM

## 2022-03-29 DIAGNOSIS — F51.01 PRIMARY INSOMNIA: ICD-10-CM

## 2022-03-29 DIAGNOSIS — G62.9 NEUROPATHY: ICD-10-CM

## 2022-03-29 DIAGNOSIS — Z23 ENCOUNTER FOR IMMUNIZATION: ICD-10-CM

## 2022-03-29 PROCEDURE — 99396 PREV VISIT EST AGE 40-64: CPT | Performed by: INTERNAL MEDICINE

## 2022-03-29 PROCEDURE — 90750 HZV VACC RECOMBINANT IM: CPT | Performed by: INTERNAL MEDICINE

## 2022-03-29 PROCEDURE — 90471 IMMUNIZATION ADMIN: CPT | Performed by: INTERNAL MEDICINE

## 2022-03-29 RX ORDER — RIZATRIPTAN BENZOATE 5 MG/1
TABLET ORAL
Qty: 12 TABLET | Refills: 2 | Status: SHIPPED | OUTPATIENT
Start: 2022-03-29

## 2022-03-29 RX ORDER — TEMAZEPAM 22.5 MG/1
CAPSULE ORAL
Qty: 90 CAPSULE | Refills: 1 | Status: SHIPPED | OUTPATIENT
Start: 2022-03-29

## 2022-03-29 RX ORDER — RIZATRIPTAN BENZOATE 5 MG/1
TABLET ORAL
COMMUNITY
End: 2022-03-29 | Stop reason: SDUPTHER

## 2022-03-29 NOTE — LETTER
3/30/2022 10:30 AM    Ms. Lise Figueroa  [address] [place] [address],[address] [id number]      Dear Care Provider    Please fax us the most recent mammogram so that we may update the patient's records for continuity of care. Our fax number: [phone].     Patient:   Chelsie Lavonne  1965          Sincerely,      Kaci Courtney MD

## 2022-03-29 NOTE — PROGRESS NOTES
1. \"Have you been to the ER, urgent care clinic since your last visit? Hospitalized since your last visit? \" No    2. \"Have you seen or consulted any other health care providers outside of the [address] [place] [address] since your last visit? \" No     3. For patients aged 39-70: Has the patient had a colonoscopy / FIT/ Cologuard? Yes - no Care Gap present      If the patient is female:    4. For patients aged 41-77: Has the patient had a mammogram within the past 2 years? Yes - no Care Gap present      5. For patients aged 21-65: Has the patient had a pap smear? Yes - Care Gap present.  Rooming MA/LPN to request most recent results

## 2022-03-30 LAB
ALBUMIN SERPL-MCNC: 4.1 G/DL (ref 3.5–5)
ALBUMIN/GLOB SERPL: 1.5 {RATIO} (ref 1.1–2.2)
ALP SERPL-CCNC: 58 U/L (ref 45–117)
ALT SERPL-CCNC: 26 U/L (ref 12–78)
ANION GAP SERPL CALC-SCNC: 1 MMOL/L (ref 5–15)
AST SERPL-CCNC: 16 U/L (ref 15–37)
BILIRUB SERPL-MCNC: 0.5 MG/DL (ref 0.2–1)
BUN SERPL-MCNC: 17 MG/DL (ref 6–20)
BUN/CREAT SERPL: 19 (ref 12–20)
CALCIUM SERPL-MCNC: 8.8 MG/DL (ref 8.5–10.1)
CHLORIDE SERPL-SCNC: 107 MMOL/L (ref 97–108)
CHOLEST SERPL-MCNC: 212 MG/DL
CO2 SERPL-SCNC: 30 MMOL/L (ref 21–32)
CREAT SERPL-MCNC: 0.9 MG/DL (ref 0.55–1.02)
ERYTHROCYTE [DISTWIDTH] IN BLOOD BY AUTOMATED COUNT: 13.3 % (ref 11.5–14.5)
EST. AVERAGE GLUCOSE BLD GHB EST-MCNC: 82 MG/DL
GLOBULIN SER CALC-MCNC: 2.7 G/DL (ref 2–4)
GLUCOSE SERPL-MCNC: 80 MG/DL (ref 65–100)
HBA1C MFR BLD: 4.5 % (ref 4–5.6)
HCT VFR BLD AUTO: 44.3 % (ref 35–47)
HDLC SERPL-MCNC: 66 MG/DL
HDLC SERPL: 3.2 {RATIO} (ref 0–5)
HGB BLD-MCNC: 14.8 G/DL (ref 11.5–16)
LDLC SERPL CALC-MCNC: 132.4 MG/DL (ref 0–100)
MCH RBC QN AUTO: 30.6 PG (ref 26–34)
MCHC RBC AUTO-ENTMCNC: 33.4 G/DL (ref 30–36.5)
MCV RBC AUTO: 91.5 FL (ref 80–99)
NRBC # BLD: 0 K/UL (ref 0–0.01)
NRBC BLD-RTO: 0 PER 100 WBC
PLATELET # BLD AUTO: 242 K/UL (ref 150–400)
PMV BLD AUTO: 11.5 FL (ref 8.9–12.9)
POTASSIUM SERPL-SCNC: 4.4 MMOL/L (ref 3.5–5.1)
PROT SERPL-MCNC: 6.8 G/DL (ref 6.4–8.2)
RBC # BLD AUTO: 4.84 M/UL (ref 3.8–5.2)
SODIUM SERPL-SCNC: 138 MMOL/L (ref 136–145)
TRIGL SERPL-MCNC: 68 MG/DL (ref ?–150)
TSH SERPL DL<=0.05 MIU/L-ACNC: 0.85 UIU/ML (ref 0.36–3.74)
VLDLC SERPL CALC-MCNC: 13.6 MG/DL
WBC # BLD AUTO: 7.2 K/UL (ref 3.6–11)

## 2022-06-30 ENCOUNTER — OFFICE VISIT (OUTPATIENT)
Dept: ORTHOPEDIC SURGERY | Age: 57
End: 2022-06-30
Payer: COMMERCIAL

## 2022-06-30 VITALS — HEIGHT: 68 IN | WEIGHT: 180 LBS | BODY MASS INDEX: 27.28 KG/M2

## 2022-06-30 DIAGNOSIS — M50.30 DEGENERATIVE, INTERVERTEBRAL DISC, CERVICAL: ICD-10-CM

## 2022-06-30 DIAGNOSIS — M54.12 CERVICAL RADICULITIS: ICD-10-CM

## 2022-06-30 DIAGNOSIS — M54.2 NECK PAIN: Primary | ICD-10-CM

## 2022-06-30 DIAGNOSIS — G89.29 CHRONIC MIDLINE LOW BACK PAIN WITH BILATERAL SCIATICA: ICD-10-CM

## 2022-06-30 DIAGNOSIS — M54.41 CHRONIC MIDLINE LOW BACK PAIN WITH BILATERAL SCIATICA: ICD-10-CM

## 2022-06-30 DIAGNOSIS — M54.42 CHRONIC MIDLINE LOW BACK PAIN WITH BILATERAL SCIATICA: ICD-10-CM

## 2022-06-30 PROCEDURE — 99213 OFFICE O/P EST LOW 20 MIN: CPT | Performed by: PHYSICIAN ASSISTANT

## 2022-06-30 RX ORDER — GABAPENTIN 300 MG/1
CAPSULE ORAL
Qty: 60 CAPSULE | Refills: 5 | Status: SHIPPED | OUTPATIENT
Start: 2022-06-30

## 2022-06-30 RX ORDER — DICLOFENAC SODIUM 75 MG/1
75 TABLET, DELAYED RELEASE ORAL 2 TIMES DAILY WITH MEALS
Qty: 60 TABLET | Refills: 1 | Status: SHIPPED | OUTPATIENT
Start: 2022-06-30

## 2022-06-30 NOTE — PROGRESS NOTES
1. Have you been to the ER, urgent care clinic since your last visit? Hospitalized since your last visit? No    2. Have you seen or consulted any other health care providers outside of the [address] [place] [address] since your last visit? Include any pap smears or colon screening.  No    Chief Complaint   Patient presents with    Neck Pain    Back Pain     Medication Management - Gabapentin

## 2022-06-30 NOTE — PROGRESS NOTES
Kassy Figueroa (: 1965) is a 65 y.o. female patient here for evaluation of the following chief complaint(s):  Neck Pain and Back Pain (Medication Management - Gabapentin)         ASSESSMENT/PLAN:  Below is the assessment and plan developed based on review of pertinent history, physical exam, labs, studies, and medications. 1. Neck pain  -     XR SPINE CERV 4 OR 5 V; Future  -     REFERRAL TO PHYSICAL THERAPY; Future  2. Cervical radiculitis  -     REFERRAL TO PHYSICAL THERAPY; Future  3. Degenerative, intervertebral disc, cervical  -     REFERRAL TO PHYSICAL THERAPY; Future  4. Chronic midline low back pain with bilateral sciatica  -     gabapentin (NEURONTIN) 300 mg capsule; Take one to two capsules at bedtime, Normal, Disp-60 Capsule, R-5      The patient's radiologic findings have been reviewed with her in detail today. She has an acute onset of axial neck pain with right upper extremity radiculopathy. She will continue with gabapentin, and has been given a prescription for diclofenac as well. She has been referred to outpatient physical therapy for cervical traction specifically. She will return to the office with persistent and worsening neck symptoms. Otherwise, she may return for a follow-up visit in 6 months for medication management. Return in about 6 months (around 2022) for Medication management. SUBJECTIVE/OBJECTIVE:  Kassy Figueroa (: 1965) is a 65 y.o. female who presents today for the following:  Chief Complaint   Patient presents with    Neck Pain    Back Pain     Medication Management - Gabapentin        HPI   Ms. Figueroa today for a follow-up visit for medication management. She has a longstanding history of chronic low back pain with bilateral foot numbness. She states that her symptoms are consistent and are unchanged. She continues with gabapentin 1-2 at bedtime. She does request refill of this.   She does note a new onset of right-sided posterior neck pain and catching with some popping and crepitation in her neck intermittently. She has had intermittent numbness in her right upper extremity to her fingertips over the past 2 months as well. No arm pain or weakness. No difficulty with balance, falls, dropping objects. IMAGING:  XR Results (most recent):  Results from Appointment encounter on 06/30/22    XR SPINE CERV 4 OR 5 V    Narrative  AP and lateral films of the cervical spine reveal no evidence of acute fracture, lytic lesion, or instability. There is a slight loss of normal cervical lordosis. There is presence of moderate disc degeneration at C5-6 with mild disc degeneration at C6-7. MRI Results (most recent):  No results found for this or any previous visit. No Known Allergies    Current Outpatient Medications   Medication Sig    diclofenac EC (VOLTAREN) 75 mg EC tablet Take 1 Tablet by mouth two (2) times daily (with meals).  gabapentin (NEURONTIN) 300 mg capsule Take one to two capsules at bedtime    temazepam (RESTORIL) 22.5 mg capsule TAKE 1 CAPSULE NIGHTLY AS  NEEDED FOR SLEEP. MAXIMUM  DAILY AMOUNT: 22.5 MG    rizatriptan (MAXALT) 5 mg tablet rizatriptan 5 mg tablet    ibuprofen (MOTRIN) 800 mg tablet TK 1 T PO Q 8 H PRN     No current facility-administered medications for this visit. Past Medical History:   Diagnosis Date    Nausea & vomiting         Past Surgical History:   Procedure Laterality Date    HX HYSTERECTOMY  12/08/2021    HX ORTHOPAEDIC      [facility] and shoulder surgery [facility] in 2001       Family History   Problem Relation Age of Onset    Elevated Lipids Father         Social History     Tobacco Use    Smoking status: Never Smoker    Smokeless tobacco: Never Used   Substance Use Topics    Alcohol use: No        Review of Systems   Constitutional: Negative. Respiratory: Negative. Cardiovascular: Negative. Gastrointestinal: Negative. Endocrine: Negative. Genitourinary: Negative. Musculoskeletal: Positive for back pain, neck pain and neck stiffness. Skin: Negative. Allergic/Immunologic: Negative. Neurological: Positive for numbness. Hematological: Negative. Psychiatric/Behavioral: Negative. All other systems reviewed and are negative. No flowsheet data found. Vitals:  Ht 5' 8\" (1.727 m)   Wt 180 lb (81.6 kg)   BMI 27.37 kg/m²    Body mass index is 27.37 kg/m². Physical Exam    Neurologic  Overall Assessment of Muscle Strength and Tone reveals  Upper Extremities - Right Deltoid - 5/5. Left Deltoid - 5/5. Right Bicep - 5/5. Left Bicep - 5/5. Right Tricep - 5/5. Left Tricep - 5/5. Right Wrist Extensors - 5/5. Left Wrist Extensors - 5/5. Right Wrist Flexors - 5/5. Left Wrist Flexors - 5/5. Right Intrinsics - 5/5. Left Intrinsics - 5/5. General Assessment of Reflexes  Right Hand - Webb's sign is negative in the right hand. Left Hand - Webb's sign is negative in the left hand. Reflexes (Dermatomes)  2/2 Normal - Left Bicep (C5-6), Left Tricep (C7-8), Left Brachioradialis (C5-6), Right Bicep (C5-6), Right Tricep (C7-8) and Right Brachioradialis (C5-6). Musculoskeletal  Global Assessment  Examination of related systems reveals - well-developed, well-nourished, in no acute distress, alert and oriented x 3 and normal coordination. Gait and Station - normal gait and station and normal posture. Spine/Ribs/Pelvis  Cervical Spine - Evaluation of related systems reveals - no lymphadenopathy and neurovascularly intact bilaterally. Inspection and Palpation - Tenderness - moderate and localized. Assessment of pain reveals the following findings: - Location - cervical area. ROJM - Flexion - 85 °. Right Lateral Flexion - 35 °. Left Lateral Flexion - 35 °. Extension - 70 °. Right Rotation - 80 °. Left Rotation - 80 °.  Cervical Spine - Functional Testing - Foraminal Compression/Spurling's Test negative, Shoulder Depression Test negative, Upper Limb Tension Test negative. Lumbosacral Spine - Examination of the lumbosacral spine reveals - no tenderness to palpation, no pain, normal strength and tone, no laxity or crepitus and normal lumbosacral spine movements. Dr. Carmen Díaz was available for immediate consult during this encounter. An electronic signature was used to authenticate this note.   -- Alejandra Russo, PA

## 2022-06-30 NOTE — LETTER
CONTROLLED SUBSTANCE MEDICATION AGREEMENT  Patient Name: Collette Age Merkel  Patient YOB: 1965    [id number]      I understand, that controlled substance medications may be used to help better manage my symptoms and to improve my ability to function at home, work and in social settings. However, I also understand that these medications do have risks, which have been discussed with me, including possible development of physical or psychological dependence. I understand that successful treatment requires mutual trust and honesty between me and my provider. I understand and agree that following this Medication Agreement is necessary in continuing my provider-patient relationship and the success of my treatment plan. Explanation from my Provider: Benefits and Goals of Controlled Substance Medications: There are two potential goals for your treatment: (1) decreased pain and suffering (2) improved daily life functions. There are many possible treatments for your chronic condition(s). Alternatives such as physical therapy, yoga, massage, home daily exercise, meditation, relaxation techniques, injections, chiropractic manipulations, surgery, cognitive therapy, hypnosis and many medications that are not habit-forming may be used. Use of controlled substance medications may be helpful, but they are unlikely to resolve all symptoms or restore all function. Explanation from my Provider: Risks of Controlled Substance Medications:   Opioid pain medications: These medications can lead to problems such as addiction/dependence, sedation, lightheadedness/dizziness, memory issues, falls, constipation, nausea, or vomiting. They may also impair the ability to drive or operate machinery. Additionally, these medications may lower testosterone levels, leading to loss of bone strength, stamina and sex drive.   They may cause problems with breathing, sleep apnea and reduced coughing, which is especially dangerous for patients with lung disease. Overdose or dangerous interactions with alcohol and other medications may occur, leading to death. Hyperalgesia may develop, which means patients receiving opioids for the treatment of pain may become more sensitive to certain painful stimuli, and in some cases, experience pain from ordinarily non-painful stimuli. Women between the ages of 14-53 who could become pregnant should carefully weigh the risks and benefits of opioids with their physicians, as these medications increase the risk of pregnancy complications, including miscarriage,  delivery and stillbirth. It is also possible for babies to be born addicted to opioids. Opioid dependence withdrawal symptoms may include; feelings of uneasiness, increased pain, irritability, belly pain, diarrhea, sweats and goose-flesh. Testosterone replacement therapy:  Potential side effects include increased risk of stroke and heart attack, blood clots, increased blood pressure, increased cholesterol, enlarged prostate, sleep apnea, irritability/aggression and other mood disorders, and decreased fertility. Kassy Figueroa (1965)             Page 1 of 4    Initials:_______    Benzodiazepines and non-benzodiazepine sleep medications: These medications can lead to problems such as addiction/dependence, sedation, fatigue, lightheadedness, dizziness, incoordination, falls, depression, hallucinations, and impaired judgment, memory and concentration. The ability to drive and operate machinery may also be affected. Abnormal sleep-related behaviors have been reported, including sleepwalking, driving, making telephone calls, eating, or having sex while not fully awake. These medications can suppress breathing and worsen sleep apnea, particularly when combined with alcohol or other sedating medications, potentially leading to death.  Dependence withdrawal symptoms may include tremors, anxiety, hallucinations and seizures. Stimulants:  Common adverse effects include addiction/dependence, increased blood pressure and heart rate, decreased appetite, nausea, involuntary weight loss, insomnia,  irritability, and headaches. These risks may increase when these medications are combined with other stimulants, such as caffeine pills or energy drinks, certain weight loss supplements and oral decongestants. Dependence withdrawal symptoms may include depressed mood, loss of interest, suicidal thoughts, anxiety, fatigue, appetite changes and agitation. I agree and understand that I and my prescriber have the following rights and responsibilities regarding my treatment plan:   1. MY RIGHTS:  To be informed of my treatment and medication plan. To be an active participant in my health and wellbeing. 2. MY RESPONSIBILITY AND UNDERSTANDING FOR USE OF MEDICATIONS   I will take medications at the dose and frequency as directed. For my safety, I will not increase or change how I take my medications without the recommendation of my healthcare provider.  I will actively participate in any program recommended by my provider which may improve function, including social, physical, psychological programs.  I will not take my medications with alcohol or other drugs not prescribed to me. I understand that drinking alcohol with my medications increases the chances of side effects, including reduced breathing rate and could lead to personal injury when operating machinery.  I understand that if I have a history of substance use disorders, including alcohol or other illicit drugs, that I may be at increased risk of addiction to my medications.  I agree to notify my provider immediately if I should become pregnant so that my treatment plan can be adjusted.    I agree and understand that I shall only receive controlled substance medications from the prescriber that signed this agreement unless there is written agreement among other prescribers of controlled substances outlining the responsibility of the medications being prescribed.  I understand that the if the controlled medication is not helping to achieve goals, the dosage may be tapered and no longer prescribed. 3. MY RESPONSIBILITY FOR COMMUNICATION / PRESCRIPTION RENEWALS   I agree that all controlled substance medications that I take will be prescribed only by my provider. If another healthcare provider prescribes me medication in an emergency, I will notify my provider within seventy-two (72) hours. Kassy Figueroa (1965)             Page 2 of 4    Initials:_______   I will arrange for refills at the prescribed interval ONLY during regular office hours. I will not ask for refills earlier than agreed, after-hours, on holidays or weekends. Refills may take up to 72 hours for processing and prescriptions to reach the pharmacy.  I will inform my other health care providers that I am taking these medications and of the existence of this [id number]. In the event of an emergency, I will provide the same information to the emergency department prescribers.  I will keep my provider updated on the pharmacy I am using for controlled medication prescription filling. 4. MY RESPONSIBILITY FOR PROTECTING MEDICATIONS   I will protect my prescriptions and medications. I understand that lost or misplaced prescriptions will not be replaced.  I will keep medications only for my own use and will not share them with others. I will keep all medications away from children.  I agree that if my medications are adjusted or discontinued, I will properly dispose of any remaining medications. I understand that I will be required to dispose of any remaining controlled medications as, directed by my prescriber, prior to being provided with any prescriptions for other controlled medications.   Medication drop box locations can be found at: [employer]  5. MY RESPONSIBILITY WITH ILLEGAL DRUGS    I will not use illegal or street drugs or another person's prescription medications not prescribed to me.  If there are identified addiction type symptoms, then referral to a program may be provided by my provider and I agree to follow through with this recommendation. 6. MY RESPONSIBILITY FOR COOPERATION WITH INVESTIGATIONS   I understand that my provider will comply with any applicable law and may discuss my use and/or possible misuse/abuse of controlled substances and alcohol, as appropriate, with any health care provider involved in my care, pharmacist, or legal authority.  I authorize my provider and pharmacy to cooperate fully with law enforcement agencies (as permitted by law) in the investigation of any possible misuse, sale, or other diversion of my controlled substances.  I agree to waive any applicable privilege or right of privacy or confidentiality with respect to these authorizations. 7. PROVIDERS RIGHT TO MONITOR FOR SAFETY: PRESCRIPTION MONITORING / DRUG TESTING   I consent to drug/toxicology screening and will submit to a drug screen upon my providers request to assure I am only taking the prescribed drugs for my safety monitoring. I understand that a drug screen is a laboratory test in which a sample of my urine, blood or saliva is checked to see what drugs I have been taking. This may entail an observed urine specimen, which means that a nurse or other health care provider may watch me provide urine, and I will cooperate if I am asked to provide an observed specimen. Kassy Figueroa (1965)             Page 3 of 4    Initials:_______  Jose Felix I understand that my provider will check a copy of my State Prescription Monitoring Program () Report in order to safely prescribe medications.      Pill Counts: I consent to pill counts when requested. I may be asked to bring all my prescribed controlled substance medications, in their original bottles, to all of my scheduled appointments. In addition, my provider may ask me to come to the practice at any time for a random pill count. 8. TERMINATION OF THIS AGREEMENT   For my safety, my prescriber has the right to stop prescribing controlled substance medications and may end this agreement.  Conditions that may result in termination of this agreement:  a. I do not show any improvement in pain, or my activity has not improved. b. I develop rapid tolerance or loss of improvement, as described in my treatment plan.  c. I develop significant side effects from the medication. d. My behavior is not consistent with the responsibilities outlined above, thereby causing safety concerns to continue prescribing controlled substance medications. e. I fail to follow the terms of this agreement. f. Other:____________________________     UNDERSTANDING THIS MEDICATION AGREEMENT:    I have read the above and have had all my questions answered. For chronic disease management, I know that my symptoms can be managed with many types of treatments. A chronic medication trial may be part of my treatment, but I must be an active participant in my care. Medication therapy is only one part of my symptom management plan. In some cases, there may be limited scientific evidence to support the chronic use of certain medications to improve symptoms and daily function. Furthermore, in certain circumstances, there may be scientific information that suggests that the use of chronic controlled substances may worsen my symptoms and increase my risk of unintentional death directly related to this medication therapy. I know that if my provider feels my risk from controlled medications is greater than my benefit, I will have my controlled substance medication(s) compassionately lowered or removed altogether. I further agree to allow this office to contact my HIPAA contact if there are concerns about my safety and use of the controlled medications. I have agreed to use the prescribed controlled substance medications to me as instructed by my provider and as stated in this Medication Agreement. My initial on each page and my signature below shows that I have read each page and I have had the opportunity to ask questions with answers provided by my provider.       Patient Name (Printed): _____________________________________    Patient Signature:  ______________________   Date: _____________      Prescriber Name (Printed): ___________________________________    Prescriber Signature: _____________________  Date: _____________     [facility] Rafael Figueroa (1965)             Page 4 of 4 None known

## 2022-12-12 DIAGNOSIS — F51.01 PRIMARY INSOMNIA: ICD-10-CM

## 2022-12-13 RX ORDER — TEMAZEPAM 22.5 MG/1
CAPSULE ORAL
Qty: 90 CAPSULE | Refills: 1 | Status: SHIPPED | OUTPATIENT
Start: 2022-12-13

## 2022-12-13 NOTE — TELEPHONE ENCOUNTER
PCP: Aracely Rothman MD    Last appt: 3/29/2022  Future Appointments   Date Time Provider Emily Rivas   4/12/2023  9:00 AM Aracely Rothman MD [facility]       Requested Prescriptions     Pending Prescriptions Disp Refills    temazepam (RESTORIL) 22.5 mg capsule 90 Capsule 1

## 2023-03-02 ENCOUNTER — OFFICE VISIT (OUTPATIENT)
Dept: ORTHOPEDIC SURGERY | Age: 58
End: 2023-03-02
Payer: COMMERCIAL

## 2023-03-02 VITALS — BODY MASS INDEX: 27.28 KG/M2 | WEIGHT: 180 LBS | HEIGHT: 68 IN

## 2023-03-02 DIAGNOSIS — M54.41 CHRONIC MIDLINE LOW BACK PAIN WITH BILATERAL SCIATICA: Primary | ICD-10-CM

## 2023-03-02 DIAGNOSIS — M54.42 CHRONIC MIDLINE LOW BACK PAIN WITH BILATERAL SCIATICA: Primary | ICD-10-CM

## 2023-03-02 DIAGNOSIS — G89.29 CHRONIC MIDLINE LOW BACK PAIN WITH BILATERAL SCIATICA: Primary | ICD-10-CM

## 2023-03-02 PROCEDURE — 99213 OFFICE O/P EST LOW 20 MIN: CPT | Performed by: PHYSICIAN ASSISTANT

## 2023-03-02 RX ORDER — GABAPENTIN 300 MG/1
CAPSULE ORAL
Qty: 60 CAPSULE | Refills: 5 | Status: SHIPPED | OUTPATIENT
Start: 2023-03-02

## 2023-03-02 NOTE — PROGRESS NOTES
Kassy Figueroa (: 1965) is a 63 y.o. female, patient, here for evaluation of the following chief complaint(s): Back Pain (Medication Management Gabapentin)       ASSESSMENT/PLAN:    Below is the assessment and plan developed based on review of pertinent history, physical exam, labs, studies, and medications. Have discussed the patient's diagnosis and radiographic findings at length and have answered all patient questions to her satisfaction. Have sent a prescription for gabapentin electronically to the patient's preferred pharmacy. Will plan on seeing the patient back for reevaluation in 6 months time for reevaluation. The patient understands and agrees to the treatment plan as outlined above. 1. Chronic midline low back pain with bilateral sciatica    Return in about 6 months (around 2023) for Assess progress with treatment plan. SUBJECTIVE/OBJECTIVE:    Kassy Figueroa (: 1965) is a 63 y.o. female    Ms. Figueroa today for a follow-up visit for medication management. She has a longstanding history of chronic low back pain with bilateral foot numbness. She states that her symptoms are consistent and are unchanged. She continues with gabapentin 1-2 at bedtime. She does request refill of this. She does note a new right knee and left ankle pain following an injury in which she fell off of a driveway in [id number]. The patient states that she is an avid runner and the pain does not typically bother her after running for a minute or 2. Patient denies gait/balance disturbance over saddle paresthesias. No flowsheet data found. Imaging:    XR Results (most recent):  Results from Appointment encounter on 22    XR SPINE CERV 4 OR 5 V    Narrative  AP and lateral films of the cervical spine reveal no evidence of acute fracture, lytic lesion, or instability. There is a slight loss of normal cervical lordosis.   There is presence of moderate disc degeneration at C5-6 with mild disc degeneration at C6-7. MRI Results (most recent):  No results found for this or any previous visit. No Known Allergies    Current Outpatient Medications   Medication Sig    temazepam (RESTORIL) 22.5 mg capsule TAKE 1 CAPSULE NIGHTLY AS  NEEDED FOR SLEEP. MAXIMUM  DAILY AMOUNT: 22.5 MG    gabapentin (NEURONTIN) 300 mg capsule Take one to two capsules at bedtime    rizatriptan (MAXALT) 5 mg tablet rizatriptan 5 mg tablet    ibuprofen (MOTRIN) 800 mg tablet TK 1 T PO Q 8 H PRN     No current facility-administered medications for this visit. Past Medical History:   Diagnosis Date    Nausea & vomiting         Past Surgical History:   Procedure Laterality Date    HX HYSTERECTOMY  12/08/2021    HX ORTHOPAEDIC      [facility] and shoulder surgery [facility] in 2001       Family History   Problem Relation Age of Onset    Elevated Lipids Father         Social History     Tobacco Use    Smoking status: Never    Smokeless tobacco: Never   Substance Use Topics    Alcohol use: No    Drug use: No        Review of Systems   Musculoskeletal:  Positive for back pain. Neurological:  Positive for numbness. All other systems reviewed and are negative. Vitals:  Ht 5' 8\" (1.727 m)   Wt 180 lb (81.6 kg)   BMI 27.37 kg/m²      Body mass index is 27.37 kg/m². Ortho Exam     Physical Exam     Neurologic  Sensory Light Touch - Intact - Globally. Overall Assessment of Muscle Strength and Tone reveals  Lower Extremities - Right Iliopsoas   5/5. Left Iliopsoas  5/5. Right Hip Flexion 5/5. Left Hip Flexion 5/5. Right Quadriceps-5/5. Left Quadriceps 5/5. Right Hamstring 5/5. Left Hamstring 5/5. Right Gastroc-Soleus  5/5. Left Gastroc-Soleus  5/5. Right Tibialis Anterior  5/5. Left Tibialis Anterior - 5/5. Right EHL - 5/5. Left EHL - 5/5. General Assessment of Reflexes  Right Ankle - Clonus is not present. Left Ankle - Clonus is not present.    Reflexes (Dermatomes)  2/2 Normal - Left Achilles (L5-S2), Left Knee (L2-4), Right Achilles (L5-S2) and Right Knee (L2-4). Musculoskeletal  Global Assessment  Examination of related systems reveals - well-developed, well-nourished, in no acute distress, alert and oriented x 3 and normal coordination. Gait and Station - normal gait and station and normal posture. Thoracic (Dorsal) Spine - Examination of the thoracic spine reveals - no tenderness over thoracic vertebrae, no pain, normal sensation and normal thoracic spine movements. Lumbosacral Spine - Examination of the lumbosacral spine reveals - no known fractures or deformities. Inspection and Palpation - Tenderness - none. Assessment of pain reveals the following findings - The pain is characterized as - none. Location -  thoracic,  lumbar and sacral regions bilaterally. Lumbosacral Spine - Functional Testing - Babinski Test negative, Straight Leg Raise Test negative. An electronic signature was used to authenticate this note.   -- Lilia Cuello PA-C

## 2023-03-02 NOTE — PROGRESS NOTES
1. Have you been to the ER, urgent care clinic since your last visit? Hospitalized since your last visit? No    2. Have you seen or consulted any other health care providers outside of the [address] [place] [address] since your last visit? Include any pap smears or colon screening.  No    Chief Complaint   Patient presents with    Back Pain     Medication Management Gabapentin

## 2023-03-08 ENCOUNTER — OFFICE VISIT (OUTPATIENT)
Dept: ORTHOPEDIC SURGERY | Age: 58
End: 2023-03-08

## 2023-03-08 VITALS — WEIGHT: 183 LBS | BODY MASS INDEX: 27.74 KG/M2 | HEIGHT: 68 IN

## 2023-03-08 DIAGNOSIS — M25.572 LEFT ANKLE PAIN, UNSPECIFIED CHRONICITY: Primary | ICD-10-CM

## 2023-03-08 DIAGNOSIS — M25.561 RIGHT KNEE PAIN, UNSPECIFIED CHRONICITY: ICD-10-CM

## 2023-03-08 DIAGNOSIS — S83.241A ACUTE MEDIAL MENISCUS TEAR OF RIGHT KNEE, INITIAL ENCOUNTER: ICD-10-CM

## 2023-03-08 DIAGNOSIS — S93.422A SPRAIN OF DELTOID LIGAMENT OF LEFT ANKLE, INITIAL ENCOUNTER: ICD-10-CM

## 2023-03-08 DIAGNOSIS — M22.41 CHONDROMALACIA OF RIGHT PATELLA: ICD-10-CM

## 2023-03-08 DIAGNOSIS — S86.112A POSTERIOR TIBIAL TENDON TEAR, TRAUMATIC, LEFT, INITIAL ENCOUNTER: ICD-10-CM

## 2023-03-08 RX ORDER — DICLOFENAC SODIUM 75 MG/1
75 TABLET, DELAYED RELEASE ORAL 2 TIMES DAILY
Qty: 60 TABLET | Refills: 3 | Status: SHIPPED | OUTPATIENT
Start: 2023-03-08

## 2023-03-08 NOTE — LETTER
3/8/2023    Patient: Jada Figueroa   YOB: 1965   Date of Visit: 3/8/2023     Tere Moreno MD  [address]  [address] [facility]  [address]  [facility]  [address] [facility] [address]    Dear Tere Moreno MD,      Thank you for referring Ms. Kassy Figueroa to [facility] for evaluation. My notes for this consultation are attached. If you have questions, please do not hesitate to call me. I look forward to following your patient along with you.       Sincerely,    Bruna Morales, DO

## 2023-03-08 NOTE — PROGRESS NOTES
Kassy Figueroa (: 1965) is a 63 y.o. female, established patient, here for evaluation of the following chief complaint(s):  Knee Pain and Ankle Pain       ASSESSMENT/PLAN:  Below is the assessment and plan developed based on review of pertinent history, physical exam, labs, studies, and medications. Findings were discussed with the patient today. We will obtain an MRI of the right knee and left ankle which will help us with further treatment planning including the possibility of surgical treatment. Patient will follow up after this MRI is performed. She will try diclofenac in the meantime    1. Left ankle pain, unspecified chronicity  -     XR ANKLE LT MIN 3 V; Future  -     MRI ANKLE LT WO CONT; Future  2. Right knee pain, unspecified chronicity  -     XR KNEE RT MIN 4 V; Future  3. Chondromalacia of right patella  -     MRI KNEE RT WO CONT; Future  4. Acute medial meniscus tear of right knee, initial encounter  -     MRI KNEE RT WO CONT; Future  5. Posterior tibial tendon tear, traumatic, left, initial encounter  -     MRI ANKLE LT WO CONT; Future  6. Sprain of deltoid ligament of left ankle, initial encounter  -     MRI ANKLE LT WO CONT; Future      Return in about 6 weeks (around 2023) for imaging results after study performed. SUBJECTIVE/OBJECTIVE:  Kassy Figueroa (: 1965) is a 63 y.o. female. She presents today with sharp aching pain in the right knee and the left ankle. She notes a fall that occurred back in October. She twisted her ankle and landed on her right knee. Since this fall she has tried therapy exercises, anti-inflammatories, and activity modifications for a number of months with minimal relief. She notes occasional sharp pains with pivoting type movements in the knee. She notes aching pain going up and down stairs.   She also notes sharp aching pain at the posterior medial ankle        No Known Allergies    Current Outpatient Medications   Medication Sig diclofenac EC (VOLTAREN) 75 mg EC tablet Take 1 Tablet by mouth two (2) times a day.    gabapentin (NEURONTIN) 300 mg capsule Take one to two capsules at bedtime  Indications: neuropathic pain    temazepam (RESTORIL) 22.5 mg capsule TAKE 1 CAPSULE NIGHTLY AS  NEEDED FOR SLEEP. MAXIMUM  DAILY AMOUNT: 22.5 MG    rizatriptan (MAXALT) 5 mg tablet rizatriptan 5 mg tablet    ibuprofen (MOTRIN) 800 mg tablet TK 1 T PO Q 8 H PRN     No current facility-administered medications for this visit. Social History     Socioeconomic History    Marital status:      Spouse name: Not on file    Number of children: Not on file    Years of education: Not on file    Highest education level: Not on file   Occupational History    Not on file   Tobacco Use    Smoking status: Never    Smokeless tobacco: Never   Substance and Sexual Activity    Alcohol use: No    Drug use: No    Sexual activity: Not on file   Other Topics Concern    Not on file   Social History Narrative    Not on file     Social Determinants of Health     Financial Resource Strain: Not on file   Food Insecurity: Not on file   Transportation Needs: Not on file   Physical Activity: Not on file   Stress: Not on file   Social Connections: Not on file   Intimate Partner Violence: Not on file   Housing Stability: Not on file       Past Surgical History:   Procedure Laterality Date    HX HYSTERECTOMY  12/08/2021    HX ORTHOPAEDIC      back and shoulder surgery from [facility] in 2001       Family History   Problem Relation Age of Onset    Elevated Lipids Father         OB History    No obstetric history on file. REVIEW OF SYSTEMS:  ROS    Positive for: Musculoskeletal  Last edited by Giancarlo Jasmine on 3/8/2023 10:31 AM.        Patient denies any recent fever, chills, nausea, vomiting, chest pain, or shortness of breath. Vitals:  Ht 5' 7.5\" (1.715 m)   Wt 183 lb (83 kg)   BMI 28.24 kg/m²    Body mass index is 28.24 kg/m².        PHYSICAL EXAM:  General exam: Patient is awake, alert, and oriented x3. Well-appearing. No acute distress. Ambulates with an antalgic gait. Heart/Lungs:  no respiratory distress, palpable pulses    Right knee: Neurovascular and sensory intact. There is tenderness to palpation in the parapatellar region. There is crepitus with range of motion. No significant effusion noted. There is medial joint line tenderness to palpation. Bonifacio's maneuver is painful. Normal stability on Lachman's exam and anterior/posterior drawer testing. No erythema or ecchymosis. Left ankle: Neurovascular and sensory intact. She has tenderness palpation at the medial ankle and posterior to the medial malleolus. There is pain in this area with passive eversion of the ankle. No obvious instability on testing. No significant swelling or ecchymosis. IMAGING:    XR Results (most recent):  Results from Appointment encounter on 03/08/23    XR ANKLE LT MIN 3 V    Narrative  X-rays of the left ankle 3 views done today show evidence of maintained joint space. No signs of acute fracture      XR KNEE RT MIN 4 V    Narrative  X-rays of the right knee 4 views done today show evidence of maintained joint space. No signs of acute fracture      Results from Appointment encounter on 06/30/22    XR SPINE CERV 4 OR 5 V    Narrative  AP and lateral films of the cervical spine reveal no evidence of acute fracture, lytic lesion, or instability. There is a slight loss of normal cervical lordosis. There is presence of moderate disc degeneration at C5-6 with mild disc degeneration at C6-7.          Orders Placed This Encounter    XR KNEE RT MIN 4 V     Standing Status:   Future     Number of Occurrences:   1     Standing Expiration Date:   3/8/2024    XR ANKLE LT MIN 3 V     Standing Status:   Future     Number of Occurrences:   1     Standing Expiration Date:   3/8/2024    MRI ANKLE LT WO CONT     Standing Status:   Future     Standing Expiration Date:   7/8/2023 Order Specific Question:   Arthrogram study     Answer:   No    MRI KNEE RT WO CONT     Standing Status:   Future     Standing Expiration Date:   7/8/2023     Order Specific Question:   Arthrogram study     Answer:   No    diclofenac EC (VOLTAREN) 75 mg EC tablet     Sig: Take 1 Tablet by mouth two (2) times a day. Dispense:  60 Tablet     Refill:  3              An electronic signature was used to authenticate this note.   -- Indira Anderson, DO

## 2023-04-06 ENCOUNTER — HOSPITAL ENCOUNTER (OUTPATIENT)
Dept: MRI IMAGING | Age: 58
End: 2023-04-06
Attending: ORTHOPAEDIC SURGERY
Payer: COMMERCIAL

## 2023-04-06 PROCEDURE — 73721 MRI JNT OF LWR EXTRE W/O DYE: CPT

## 2023-04-17 ENCOUNTER — OFFICE VISIT (OUTPATIENT)
Dept: ORTHOPEDIC SURGERY | Age: 58
End: 2023-04-17
Payer: COMMERCIAL

## 2023-04-17 VITALS — HEIGHT: 68 IN | BODY MASS INDEX: 26.83 KG/M2 | WEIGHT: 177 LBS

## 2023-04-17 DIAGNOSIS — M25.572 LEFT ANKLE PAIN, UNSPECIFIED CHRONICITY: ICD-10-CM

## 2023-04-17 DIAGNOSIS — S93.422A SPRAIN OF DELTOID LIGAMENT OF LEFT ANKLE, INITIAL ENCOUNTER: ICD-10-CM

## 2023-04-17 DIAGNOSIS — M22.8X1 MALTRACKING OF RIGHT PATELLA: Primary | ICD-10-CM

## 2023-04-17 DIAGNOSIS — M22.42 CHONDROMALACIA OF LEFT PATELLA: ICD-10-CM

## 2023-04-17 PROCEDURE — 99214 OFFICE O/P EST MOD 30 MIN: CPT | Performed by: ORTHOPAEDIC SURGERY

## 2023-04-17 NOTE — PROGRESS NOTES
Kassy Figueroa (: 1965) is a 63 y.o. female, established patient, here for evaluation of the following chief complaint(s): Ankle Pain and Knee Pain       ASSESSMENT/PLAN:  Below is the assessment and plan developed based on review of pertinent history, physical exam, labs, studies, and medications. Findings were discussed with patient today. We discussed trying physical therapy regimen for her right knee and left ankle. We discussed bracing as well. She will take anti-inflammatories as needed. See her back on as-needed basis peer discussed possibility of injections    1. Maltracking of right patella  2. Left ankle pain, unspecified chronicity  3. Sprain of deltoid ligament of left ankle, initial encounter      Return in about 6 weeks (around 2023). SUBJECTIVE/OBJECTIVE:  Kassy Figueroa (: 1965) is a 63 y.o. female. She presents today for follow-up of the right knee pain and left ankle pain. She notes continued symptoms but she has been able to stay active. She just ran in a 10K over the weekend        No Known Allergies    Current Outpatient Medications   Medication Sig    diclofenac EC (VOLTAREN) 75 mg EC tablet Take 1 Tablet by mouth two (2) times a day. (Patient not taking: Reported on 2023)    gabapentin (NEURONTIN) 300 mg capsule Take one to two capsules at bedtime  Indications: neuropathic pain    temazepam (RESTORIL) 22.5 mg capsule TAKE 1 CAPSULE NIGHTLY AS  NEEDED FOR SLEEP. MAXIMUM  DAILY AMOUNT: 22.5 MG    rizatriptan (MAXALT) 5 mg tablet rizatriptan 5 mg tablet    ibuprofen (MOTRIN) 800 mg tablet TK 1 T PO Q 8 H PRN     No current facility-administered medications for this visit.        Social History     Socioeconomic History    Marital status:      Spouse name: Not on file    Number of children: Not on file    Years of education: Not on file    Highest education level: Not on file   Occupational History    Not on file   Tobacco Use    Smoking status: Never    Smokeless tobacco: Never   Substance and Sexual Activity    Alcohol use: No    Drug use: No    Sexual activity: Not on file   Other Topics Concern    Not on file   Social History Narrative    Not on file     Social Determinants of Health     Financial Resource Strain: Not on file   Food Insecurity: Not on file   Transportation Needs: Not on file   Physical Activity: Not on file   Stress: Not on file   Social Connections: Not on file   Intimate Partner Violence: Not on file   Housing Stability: Not on file       Past Surgical History:   Procedure Laterality Date    HX HYSTERECTOMY  12/08/2021    HX ORTHOPAEDIC      back and shoulder surgery from [facility] in 2001       Family History   Problem Relation Age of Onset    Elevated Lipids Father         OB History    No obstetric history on file. REVIEW OF SYSTEMS:  ROS    Positive for: Musculoskeletal  Last edited by Chloe Celestin on 4/17/2023  9:02 AM.        Patient denies any recent fever, chills, nausea, vomiting, chest pain, or shortness of breath. Vitals:  Ht 5' 8\" (1.727 m)   Wt 177 lb (80.3 kg)   BMI 26.91 kg/m²    Body mass index is 26.91 kg/m². PHYSICAL EXAM:  General exam: Patient is awake, alert, and oriented x3. Well-appearing. No acute distress. Ambulates with an antalgic gait. Heart/Lungs:  no respiratory distress, palpable pulses    Right knee: Neurovascular and sensory intact. There is tenderness to palpation in the parapatellar region. Mild click is noted with range of motion. Positive J sign on patellar tracking. Lateral patellar shift is noted with quadriceps activation when the knee is in full extension. Normal stability of the knee. Negative Bonifacio's exam.    Left ankle: Mild tenderness palpation at the posterior medial ankle. Normal range of motion and stability    IMAGING:    XR Results (maximum last 3):   Results from Appointment encounter on 03/08/23    XR ANKLE LT MIN 3 V    Narrative  X-rays of the left ankle 3 views done today show evidence of maintained joint space. No signs of acute fracture      XR KNEE RT MIN 4 V    Narrative  X-rays of the right knee 4 views done today show evidence of maintained joint space. No signs of acute fracture      Results from Appointment encounter on 06/30/22    XR SPINE CERV 4 OR 5 V    Narrative  AP and lateral films of the cervical spine reveal no evidence of acute fracture, lytic lesion, or instability. There is a slight loss of normal cervical lordosis. There is presence of moderate disc degeneration at C5-6 with mild disc degeneration at C6-7. CT Results (maximum last 3): No results found for this or any previous visit. MRI Results (maximum last 3): Results from [place] encounter on 04/06/23    MRI ANKLE LT WO CONT    Narrative  EXAM: MRI ANKLE LT WO CONT    INDICATION: Left ankle pain    COMPARISON: None. TECHNIQUE: Axial T2 and proton density fat-saturated; coronal T2 fat-saturated;  sagittal T1, T2 fat-saturated, and spoiled gradient-echo MRI of the left ankle . CONTRAST: None. FINDINGS: Bone Marrow: Trace edema is seen in the medial malleolus. . No  fracture, dislocation, or marrow replacing process. Joint fluid: None. Tendons: There is mild thickening of the mid Achilles tendon with a convex  anterior margin related to mild tendinosis. Muscles: Within normal limits. Lateral ligaments: intact. Deltoid and spring ligaments: There is a partial tear of the deltoid ligament. Sinus tarsi: within normal limits. Plantar fascia: Small plantar calcaneal enthesophyte. No evidence of fasciitis. Articular cartilage: There is mild tibiotalar osteoarthritis with a small spur  from the anterior tibial plafond. No evidence of coalition. Soft tissue mass: No mass. There is nonspecific mild subcutaneous edema  surrounding the ankle. Impression  1. Partial tear of the deltoid ligament.  Trace edema in the medial malleolus is  likely related to avulsive stress. 2. Mild mid Achilles tendinosis. MRI KNEE RT WO CONT    Narrative  EXAM: MRI KNEE RT WO CONT    INDICATION: Patellar chondromalacia. Acute medial meniscal tear. COMPARISON: Radiographs 3/8/2023    TECHNIQUE: Axial T2 fat-saturated and proton density; coronal T1 and proton  density fat-saturated; and sagittal T2 fat-saturated, proton density  fat-saturated, and gradient echo MRI of the right knee . CONTRAST: None. FINDINGS: Bone marrow: Within normal limits. No acute fracture, dislocation, or  marrow replacing process. Joint fluid: None. Collateral ligaments and posterior, lateral corner: Intact. Medial meniscus: Intact. Lateral meniscus: Degenerative signal is seen in the anterior horn. ACL and PCL: Intact. Tendons: Intact. Muscles: Within normal limits. Patellofemoral alignment: Mild lateral tilting and subluxation of the patella. Trochlear groove is not hypoplastic. TT-TG distance: 21 mm    Articular cartilage: There is a partial-thickness cartilage defect in the deep  femoral trochlea measuring approximate 4 mm. Soft tissue mass: None. Impression  1. Evidence of a mild lateral patellar tracking abnormality. 2. Small area of partial thickness cartilage loss in the deep femoral trochlea. Nuclear Medicine Results (maximum last 3): No results found for this or any previous visit. US Results (maximum last 3): No results found for this or any previous visit. DEXA Results (maximum last 3): No results found for this or any previous visit. [facility] Results (maximum last 3): Results from Abstract encounter on 03/22/23    DONY 3D SAURABH W MAMMO BI SCREENING INCL CAD      Results from Abstract encounter on 03/10/22    DONY 3D SAURABH W MAMMO BI SCREENING INCL CAD      Results from Abstract encounter on 03/24/21    DONY 3D SAURABH W MAMMO BI SCREENING INCL CAD      IR Results (maximum last 3):   No results found for this or any previous visit. VAS/US Results (maximum last 3): No results found for this or any previous visit. PET Results (maximum last 3): No results found for this or any previous visit. XR Results (most recent):  Results from Appointment encounter on 03/08/23    XR ANKLE LT MIN 3 V    Narrative  X-rays of the left ankle 3 views done today show evidence of maintained joint space. No signs of acute fracture      XR KNEE RT MIN 4 V    Narrative  X-rays of the right knee 4 views done today show evidence of maintained joint space. No signs of acute fracture      Results from Appointment encounter on 06/30/22    XR SPINE CERV 4 OR 5 V    Narrative  AP and lateral films of the cervical spine reveal no evidence of acute fracture, lytic lesion, or instability. There is a slight loss of normal cervical lordosis. There is presence of moderate disc degeneration at C5-6 with mild disc degeneration at C6-7. No orders of the defined types were placed in this encounter. An electronic signature was used to authenticate this note.   -- Randy Varela, DO

## 2023-06-19 DIAGNOSIS — F51.01 PRIMARY INSOMNIA: Primary | ICD-10-CM

## 2023-06-21 RX ORDER — RIZATRIPTAN BENZOATE 5 MG/1
TABLET ORAL
Qty: 30 TABLET | OUTPATIENT
Start: 2023-06-21

## 2023-06-21 RX ORDER — TEMAZEPAM 22.5 MG/1
CAPSULE ORAL
OUTPATIENT
Start: 2023-06-21

## 2023-06-21 RX ORDER — RIZATRIPTAN BENZOATE 5 MG/1
TABLET ORAL
Qty: 12 TABLET | Refills: 2 | Status: SHIPPED | OUTPATIENT
Start: 2023-06-21

## 2023-06-21 RX ORDER — TEMAZEPAM 22.5 MG/1
CAPSULE ORAL
Qty: 90 CAPSULE | Refills: 1 | Status: SHIPPED | OUTPATIENT
Start: 2023-06-21 | End: 2023-12-18

## 2023-09-14 ENCOUNTER — TELEPHONE (OUTPATIENT)
Age: 58
End: 2023-09-14

## 2023-09-15 NOTE — TELEPHONE ENCOUNTER
Called patient, left voicemail for return call in regards to this. Left detailed message on personal vm stating to call back because the medication was not sent by Dr. Belkys Ruiz.

## 2023-09-15 NOTE — TELEPHONE ENCOUNTER
Called patient, verified two patient identifiers. Pt states PA was for Temazepam not Gabapentin. Informed pt we have not sent in Temazepam recently, last prescribed 06/21/23 for 90days with 1 refill. Informed pt might be because its as needed and its not due to be filled yet but to call pharmacy and insurance to double check.    Patient states she will call pharmacy to clarify/

## 2023-09-25 NOTE — TELEPHONE ENCOUNTER
Prior auth was requested on 9/18/2023 via [facility], Key: [id number]    With following response:  Outcome  Shared  Your PA request cannot be processed electronically. You will be receiving the question set by fax. For further inquiries please contact the number on the back of the member prescription card.  (Message [phone])      Form arrived via [employer] mail    Completed today and faxed to [phone] with transmission confirmation received   Response is pending     Patient updated

## 2023-09-25 NOTE — TELEPHONE ENCOUNTER
Pt would like a call as to why the PA has not been started? Pt has been without med. Please call pt to give status.

## 2023-09-28 ENCOUNTER — TELEPHONE (OUTPATIENT)
Age: 58
End: 2023-09-28

## 2023-09-28 NOTE — TELEPHONE ENCOUNTER
Pt states that she has requested four times a PA to be done for the medication, Temazepam 22 mg     Pt states she has been without medication for a week now. Can this be started? Please call pt to let her know. Thanks.

## 2023-09-29 NOTE — TELEPHONE ENCOUNTER
Called, Spoke with Pt.   Received two pt identifiers  Advised pt We are awaiting word regarding PA faxed on 09/25/23  See telephone encounter 09/14/23  Advised will callback when received determination

## 2024-02-17 RX ORDER — RIZATRIPTAN BENZOATE 5 MG/1
TABLET ORAL
Qty: 12 TABLET | Refills: 0 | Status: SHIPPED | OUTPATIENT
Start: 2024-02-17 | End: 2024-04-15 | Stop reason: SDUPTHER

## 2024-04-15 ENCOUNTER — OFFICE VISIT (OUTPATIENT)
Age: 59
End: 2024-04-15
Payer: COMMERCIAL

## 2024-04-15 VITALS
WEIGHT: 191.2 LBS | BODY MASS INDEX: 28.98 KG/M2 | TEMPERATURE: 97.9 F | HEIGHT: 68 IN | SYSTOLIC BLOOD PRESSURE: 109 MMHG | OXYGEN SATURATION: 100 % | HEART RATE: 62 BPM | RESPIRATION RATE: 18 BRPM | DIASTOLIC BLOOD PRESSURE: 74 MMHG

## 2024-04-15 DIAGNOSIS — F51.01 PRIMARY INSOMNIA: ICD-10-CM

## 2024-04-15 DIAGNOSIS — Z00.00 PHYSICAL EXAM: Primary | ICD-10-CM

## 2024-04-15 LAB
ALBUMIN SERPL-MCNC: 4 G/DL (ref 3.5–5)
ALBUMIN/GLOB SERPL: 1.4 (ref 1.1–2.2)
ALP SERPL-CCNC: 63 U/L (ref 45–117)
ALT SERPL-CCNC: 40 U/L (ref 12–78)
ANION GAP SERPL CALC-SCNC: 5 MMOL/L (ref 5–15)
AST SERPL-CCNC: 30 U/L (ref 15–37)
BILIRUB SERPL-MCNC: 0.6 MG/DL (ref 0.2–1)
BUN SERPL-MCNC: 18 MG/DL (ref 6–20)
BUN/CREAT SERPL: 18 (ref 12–20)
CALCIUM SERPL-MCNC: 9.4 MG/DL (ref 8.5–10.1)
CHLORIDE SERPL-SCNC: 108 MMOL/L (ref 97–108)
CHOLEST SERPL-MCNC: 221 MG/DL
CO2 SERPL-SCNC: 28 MMOL/L (ref 21–32)
CREAT SERPL-MCNC: 1.01 MG/DL (ref 0.55–1.02)
ERYTHROCYTE [DISTWIDTH] IN BLOOD BY AUTOMATED COUNT: 13.5 % (ref 11.5–14.5)
EST. AVERAGE GLUCOSE BLD GHB EST-MCNC: 91 MG/DL
GLOBULIN SER CALC-MCNC: 2.8 G/DL (ref 2–4)
GLUCOSE SERPL-MCNC: 95 MG/DL (ref 65–100)
HBA1C MFR BLD: 4.8 % (ref 4–5.6)
HCT VFR BLD AUTO: 34.4 % (ref 35–47)
HDLC SERPL-MCNC: 74 MG/DL
HDLC SERPL: 3 (ref 0–5)
HGB BLD-MCNC: 12 G/DL (ref 11.5–16)
HIV 1+2 AB+HIV1 P24 AG SERPL QL IA: NONREACTIVE
HIV 1/2 RESULT COMMENT: NORMAL
LDLC SERPL CALC-MCNC: 134 MG/DL (ref 0–100)
MCH RBC QN AUTO: 29.6 PG (ref 26–34)
MCHC RBC AUTO-ENTMCNC: 34.9 G/DL (ref 30–36.5)
MCV RBC AUTO: 84.9 FL (ref 80–99)
NRBC # BLD: 0 K/UL (ref 0–0.01)
NRBC BLD-RTO: 0 PER 100 WBC
PLATELET # BLD AUTO: 217 K/UL (ref 150–400)
PMV BLD AUTO: 11.8 FL (ref 8.9–12.9)
POTASSIUM SERPL-SCNC: 4.5 MMOL/L (ref 3.5–5.1)
PROT SERPL-MCNC: 6.8 G/DL (ref 6.4–8.2)
RBC # BLD AUTO: 4.05 M/UL (ref 3.8–5.2)
SODIUM SERPL-SCNC: 141 MMOL/L (ref 136–145)
TRIGL SERPL-MCNC: 65 MG/DL
TSH SERPL DL<=0.05 MIU/L-ACNC: 0.94 UIU/ML (ref 0.36–3.74)
VLDLC SERPL CALC-MCNC: 13 MG/DL
WBC # BLD AUTO: 5.5 K/UL (ref 3.6–11)

## 2024-04-15 PROCEDURE — 99396 PREV VISIT EST AGE 40-64: CPT | Performed by: INTERNAL MEDICINE

## 2024-04-15 RX ORDER — TEMAZEPAM 22.5 MG/1
22.5 CAPSULE ORAL NIGHTLY PRN
Qty: 90 CAPSULE | Refills: 1 | Status: SHIPPED | OUTPATIENT
Start: 2024-04-15 | End: 2024-04-15 | Stop reason: SDUPTHER

## 2024-04-15 RX ORDER — RIZATRIPTAN BENZOATE 5 MG/1
TABLET ORAL
Qty: 12 TABLET | Refills: 0 | Status: SHIPPED | OUTPATIENT
Start: 2024-04-15

## 2024-04-15 RX ORDER — TEMAZEPAM 22.5 MG/1
22.5 CAPSULE ORAL NIGHTLY PRN
Qty: 90 CAPSULE | Refills: 1 | Status: SHIPPED | OUTPATIENT
Start: 2024-04-15 | End: 2024-09-14

## 2024-04-15 SDOH — ECONOMIC STABILITY: FOOD INSECURITY: WITHIN THE PAST 12 MONTHS, THE FOOD YOU BOUGHT JUST DIDN'T LAST AND YOU DIDN'T HAVE MONEY TO GET MORE.: NEVER TRUE

## 2024-04-15 SDOH — ECONOMIC STABILITY: HOUSING INSECURITY
IN THE LAST 12 MONTHS, WAS THERE A TIME WHEN YOU DID NOT HAVE A STEADY PLACE TO SLEEP OR SLEPT IN A SHELTER (INCLUDING NOW)?: NO

## 2024-04-15 SDOH — ECONOMIC STABILITY: FOOD INSECURITY: WITHIN THE PAST 12 MONTHS, YOU WORRIED THAT YOUR FOOD WOULD RUN OUT BEFORE YOU GOT MONEY TO BUY MORE.: NEVER TRUE

## 2024-04-15 SDOH — ECONOMIC STABILITY: INCOME INSECURITY: HOW HARD IS IT FOR YOU TO PAY FOR THE VERY BASICS LIKE FOOD, HOUSING, MEDICAL CARE, AND HEATING?: NOT HARD AT ALL

## 2024-04-15 ASSESSMENT — PATIENT HEALTH QUESTIONNAIRE - PHQ9
SUM OF ALL RESPONSES TO PHQ9 QUESTIONS 1 & 2: 0
SUM OF ALL RESPONSES TO PHQ QUESTIONS 1-9: 0
SUM OF ALL RESPONSES TO PHQ QUESTIONS 1-9: 0
1. LITTLE INTEREST OR PLEASURE IN DOING THINGS: NOT AT ALL
SUM OF ALL RESPONSES TO PHQ QUESTIONS 1-9: 0
2. FEELING DOWN, DEPRESSED OR HOPELESS: NOT AT ALL
SUM OF ALL RESPONSES TO PHQ QUESTIONS 1-9: 0

## 2024-04-15 NOTE — PROGRESS NOTES
\"Have you been to the ER, urgent care clinic since your last visit?  Hospitalized since your last visit?\"    NO    “Have you seen or consulted any other health care providers outside of [facility] since your last visit?”    NO            Click Here for Release of Records Request    
current treatment plan and medications, and accepts the treatment and medication with any possible risks. Pt asks appropriate questions, which were answered. Pt was instructed to call with any concerns or problems.    I have reviewed the note documented by the scribe. The services provided are my own. The documentation is accurate.

## 2024-04-15 NOTE — TELEPHONE ENCOUNTER
Future Appointments:  No future appointments.     Last Appointment With Me:  4/15/2024     Requested Prescriptions     Pending Prescriptions Disp Refills    temazepam (RESTORIL) 22.5 MG capsule 90 capsule 1     Sig: Take 1 capsule by mouth nightly as needed for Sleep for up to 180 doses. Max Daily Amount: 22.5 mg

## 2024-05-02 ENCOUNTER — HOSPITAL ENCOUNTER (OUTPATIENT)
Facility: HOSPITAL | Age: 59
Discharge: HOME | End: 2024-05-02
Payer: COMMERCIAL

## 2024-05-02 VITALS — BODY MASS INDEX: 28.95 KG/M2 | WEIGHT: 191 LBS | HEIGHT: 68 IN

## 2024-05-02 DIAGNOSIS — Z00.00 PHYSICAL EXAM: ICD-10-CM

## 2024-05-02 PROCEDURE — 77063 BREAST TOMOSYNTHESIS BI: CPT

## 2024-08-05 ENCOUNTER — HOSPITAL ENCOUNTER (OUTPATIENT)
Facility: HOSPITAL | Age: 59
Discharge: HOME | End: 2024-08-08
Payer: COMMERCIAL

## 2024-08-05 DIAGNOSIS — M54.2 NECK PAIN: ICD-10-CM

## 2024-08-05 DIAGNOSIS — G89.29 CHRONIC BILATERAL LOW BACK PAIN WITH BILATERAL SCIATICA: ICD-10-CM

## 2024-08-05 DIAGNOSIS — M48.02 CERVICAL STENOSIS OF SPINE: ICD-10-CM

## 2024-08-05 DIAGNOSIS — M54.12 CERVICAL RADICULITIS: ICD-10-CM

## 2024-08-05 DIAGNOSIS — M54.42 CHRONIC BILATERAL LOW BACK PAIN WITH BILATERAL SCIATICA: ICD-10-CM

## 2024-08-05 DIAGNOSIS — M54.41 CHRONIC BILATERAL LOW BACK PAIN WITH BILATERAL SCIATICA: ICD-10-CM

## 2024-08-05 PROCEDURE — 6360000004 HC RX CONTRAST MEDICATION: Performed by: PHYSICIAN ASSISTANT

## 2024-08-05 PROCEDURE — A9579 GAD-BASE MR CONTRAST NOS,1ML: HCPCS | Performed by: PHYSICIAN ASSISTANT

## 2024-08-05 PROCEDURE — 72156 MRI NECK SPINE W/O & W/DYE: CPT

## 2024-08-05 RX ADMIN — GADOTERIDOL 17 ML: 279.3 INJECTION, SOLUTION INTRAVENOUS at 16:14

## 2024-08-06 ENCOUNTER — OFFICE VISIT (OUTPATIENT)
Age: 59
End: 2024-08-06
Payer: COMMERCIAL

## 2024-08-06 VITALS
RESPIRATION RATE: 18 BRPM | BODY MASS INDEX: 28.04 KG/M2 | SYSTOLIC BLOOD PRESSURE: 115 MMHG | HEART RATE: 62 BPM | WEIGHT: 185 LBS | TEMPERATURE: 97.8 F | HEIGHT: 68 IN | DIASTOLIC BLOOD PRESSURE: 69 MMHG | OXYGEN SATURATION: 100 %

## 2024-08-06 DIAGNOSIS — T63.481A ALLERGIC REACTION TO INSECT STING, ACCIDENTAL OR UNINTENTIONAL, INITIAL ENCOUNTER: Primary | ICD-10-CM

## 2024-08-06 PROCEDURE — 99213 OFFICE O/P EST LOW 20 MIN: CPT | Performed by: INTERNAL MEDICINE

## 2024-08-06 RX ORDER — PREDNISONE 20 MG/1
TABLET ORAL
Qty: 12 TABLET | Refills: 0 | Status: SHIPPED | OUTPATIENT
Start: 2024-08-06

## 2024-08-06 RX ORDER — EPINEPHRINE 0.3 MG/.3ML
0.3 INJECTION SUBCUTANEOUS ONCE
Qty: 0.3 ML | Refills: 0 | Status: SHIPPED | OUTPATIENT
Start: 2024-08-06 | End: 2024-08-06

## 2024-08-06 ASSESSMENT — ENCOUNTER SYMPTOMS: SHORTNESS OF BREATH: 0

## 2024-08-06 ASSESSMENT — PATIENT HEALTH QUESTIONNAIRE - PHQ9
2. FEELING DOWN, DEPRESSED OR HOPELESS: NOT AT ALL
1. LITTLE INTEREST OR PLEASURE IN DOING THINGS: NOT AT ALL
SUM OF ALL RESPONSES TO PHQ QUESTIONS 1-9: 0
SUM OF ALL RESPONSES TO PHQ9 QUESTIONS 1 & 2: 0
SUM OF ALL RESPONSES TO PHQ QUESTIONS 1-9: 0

## 2024-08-06 NOTE — PROGRESS NOTES
HISTORY OF PRESENT ILLNESS  Brandy Vance is a 59 y.o. female.  Insect Bite  Pertinent negatives include no chest pain.     Last here 4/12/23. Pt is here for acute care.       She was stung by a bee x yesterday morning on L arm and R hand. She has been taking benadryl and advil for her symptoms recently. She notes she has had chills.  This morning no fevers no tongue swelling no throat closing has continued swelling in the left arm feels warm     She was stung x 1 month ago on her L ankle but states her symptoms resolved.     Will give prednisone and EpiPen to use incase of an anaphylactic reaction.       Patient Active Problem List   Diagnosis    Primary insomnia    BCC (basal cell carcinoma of skin)     Current Outpatient Medications   Medication Sig Dispense Refill    predniSONE (DELTASONE) 20 MG tablet 60mg po daily for 2days, 40mg po daily for 2 days, 20mg po daily for 2days then stop 12 tablet 0    EPINEPHrine (EPIPEN 2-KRISHAN) 0.3 MG/0.3ML SOAJ injection Inject 0.3 mLs into the muscle once for 1 dose Use as directed for allergic reaction 0.3 mL 0    gabapentin (NEURONTIN) 300 MG capsule Take 2 capsules by mouth nightly for 180 days. Max Daily Amount: 600 mg 180 capsule 1    rizatriptan (MAXALT) 5 MG tablet TAKE 1 TABLET DAILY AS     NEEDED FOR ONSET OF        MIGRAINE 12 tablet 0    temazepam (RESTORIL) 22.5 MG capsule Take 1 capsule by mouth nightly as needed for Sleep for up to 180 doses. Max Daily Amount: 22.5 mg 90 capsule 1    meloxicam (MOBIC) 15 MG tablet Take 1 tablet by mouth daily 30 tablet 3    gabapentin (NEURONTIN) 300 MG capsule Take one to two capsules at bedtime  Indications: neuropathic pain       No current facility-administered medications for this visit.     Past Surgical History:   Procedure Laterality Date    HYSTERECTOMY (CERVIX STATUS UNKNOWN)  12/08/2021    [facility] LOC BREAST BIOPSY RIGHT Right 2/24/2020    TANJA STEROTACTIC LOC BREAST BIOPSY RIGHT [facility] AMB HISTORICAL

## 2024-09-24 DIAGNOSIS — F51.01 PRIMARY INSOMNIA: ICD-10-CM

## 2024-09-24 RX ORDER — TEMAZEPAM 22.5 MG/1
CAPSULE ORAL
Qty: 90 CAPSULE | Refills: 1 | Status: SHIPPED | OUTPATIENT
Start: 2024-09-24 | End: 2025-03-23

## 2024-12-16 ENCOUNTER — TELEPHONE (OUTPATIENT)
Age: 59
End: 2024-12-16

## 2024-12-16 NOTE — TELEPHONE ENCOUNTER
Pt called in states prior auth needed for     temazepam (RESTORIL) 22.5 MG capsule     Please call once completed

## 2024-12-17 NOTE — TELEPHONE ENCOUNTER
Paper copy of PA is required for insurance. PA questions are being faxed to our office for completion

## 2024-12-26 NOTE — TELEPHONE ENCOUNTER
Spoke to pt using two pt identifiers.  Pt was informed that a paper copy of the PA is required.     Pt states they are unable to sleep without this medication. I informed them I would speak with PCP [facility] base nurse Segundo when he returns to the office to see what else can be done.

## 2024-12-27 NOTE — TELEPHONE ENCOUNTER
Called, Spoke with Pt  Received two pt identifiers  Advised pt we have been trying to receive the PA form for restoril  Advised pt Insurance never faxed form  Advised pt spoke with an agent from her insurance and walked me through on how to get to the online form on [address]  Advised pt filled form out and faxed w/confirmation  Awaiting PA response  Pt verbalizes understanding of the instructions and has no further questions at this time.

## 2025-01-13 ENCOUNTER — HOSPITAL ENCOUNTER (OUTPATIENT)
Facility: HOSPITAL | Age: 60
Discharge: HOME | End: 2025-01-16
Attending: ORTHOPAEDIC SURGERY
Payer: COMMERCIAL

## 2025-01-13 DIAGNOSIS — M25.561 RIGHT KNEE PAIN, UNSPECIFIED CHRONICITY: ICD-10-CM

## 2025-01-13 DIAGNOSIS — S83.281A ACUTE LATERAL MENISCAL TEAR, RIGHT, INITIAL ENCOUNTER: ICD-10-CM

## 2025-01-13 PROCEDURE — 73721 MRI JNT OF LWR EXTRE W/O DYE: CPT

## 2025-03-19 ENCOUNTER — TELEPHONE (OUTPATIENT)
Age: 60
End: 2025-03-19

## 2025-03-19 DIAGNOSIS — F51.01 PRIMARY INSOMNIA: ICD-10-CM

## 2025-03-19 NOTE — TELEPHONE ENCOUNTER
temazepam (RESTORIL) 22.5 MG capsule     rizatriptan (MAXALT) 5 MG tablet    90 day mail order [place] [facility]

## 2025-03-20 RX ORDER — TEMAZEPAM 22.5 MG/1
CAPSULE ORAL
Qty: 90 CAPSULE | Refills: 0 | Status: SHIPPED | OUTPATIENT
Start: 2025-03-20 | End: 2025-09-16

## 2025-03-20 RX ORDER — RIZATRIPTAN BENZOATE 5 MG/1
TABLET ORAL
Qty: 12 TABLET | Refills: 0 | Status: SHIPPED | OUTPATIENT
Start: 2025-03-20

## 2025-03-24 DIAGNOSIS — F51.01 PRIMARY INSOMNIA: ICD-10-CM

## 2025-03-24 NOTE — TELEPHONE ENCOUNTER
Caller requests Refill of:    temazepam (RESTORIL) 22.5 MG capsule     rizatriptan (MAXALT) 5 MG tablet      Pt states pharmacy never received refill request on 03/20/25    [facility]:    [facility] - [facility] - P [phone] - F [phone]  [facility]  [place] [address]  Phone: [phone] Fax: [phone]         Visit / Appointment History:  Future Appointment at [facility]:  4/15/2025   Last Appointment With PCP:  8/6/2024       Caller confirmed instructions and dosages as correct.    Caller was advised that Meds will be refilled as soon as possible, however there can be a 48-72 business hour turn around on refill requests.

## 2025-03-24 NOTE — TELEPHONE ENCOUNTER
Future Appointments:  Future Appointments   Date Time Provider Department Center   4/15/2025  9:30 AM Chloe Ornelas MD [facility] DEP        Last Appointment With Me:  8/6/2024     Requested Prescriptions     Pending Prescriptions Disp Refills    rizatriptan (MAXALT) 5 MG tablet 12 tablet 0     Sig: TAKE 1 TABLET DAILY AS     NEEDED FOR ONSET OF        MIGRAINE    temazepam (RESTORIL) 22.5 MG capsule 90 capsule 0     Sig: Take 1 capsule by mouth nightly as needed for Sleep for up to 180 days. Max Daily Amount: 22.5 mg

## 2025-03-25 RX ORDER — RIZATRIPTAN BENZOATE 5 MG/1
TABLET ORAL
Qty: 12 TABLET | Refills: 0 | Status: SHIPPED | OUTPATIENT
Start: 2025-03-25

## 2025-03-25 RX ORDER — TEMAZEPAM 22.5 MG/1
22.5 CAPSULE ORAL NIGHTLY PRN
Qty: 90 CAPSULE | Refills: 0 | Status: SHIPPED | OUTPATIENT
Start: 2025-03-25 | End: 2025-09-21

## 2025-04-15 ENCOUNTER — OFFICE VISIT (OUTPATIENT)
Age: 60
End: 2025-04-15
Payer: COMMERCIAL

## 2025-04-15 VITALS
OXYGEN SATURATION: 100 % | WEIGHT: 192 LBS | BODY MASS INDEX: 29.1 KG/M2 | HEIGHT: 68 IN | SYSTOLIC BLOOD PRESSURE: 119 MMHG | HEART RATE: 64 BPM | DIASTOLIC BLOOD PRESSURE: 81 MMHG | TEMPERATURE: 97.2 F

## 2025-04-15 DIAGNOSIS — Z00.00 PHYSICAL EXAM: Primary | ICD-10-CM

## 2025-04-15 DIAGNOSIS — Z00.00 PHYSICAL EXAM: ICD-10-CM

## 2025-04-15 DIAGNOSIS — F51.01 PRIMARY INSOMNIA: ICD-10-CM

## 2025-04-15 PROCEDURE — 99396 PREV VISIT EST AGE 40-64: CPT | Performed by: INTERNAL MEDICINE

## 2025-04-15 RX ORDER — TRAZODONE HYDROCHLORIDE 50 MG/1
50 TABLET ORAL NIGHTLY
Qty: 90 TABLET | Refills: 0 | Status: SHIPPED | OUTPATIENT
Start: 2025-04-15

## 2025-04-15 SDOH — ECONOMIC STABILITY: FOOD INSECURITY: WITHIN THE PAST 12 MONTHS, YOU WORRIED THAT YOUR FOOD WOULD RUN OUT BEFORE YOU GOT MONEY TO BUY MORE.: NEVER TRUE

## 2025-04-15 SDOH — ECONOMIC STABILITY: FOOD INSECURITY: WITHIN THE PAST 12 MONTHS, THE FOOD YOU BOUGHT JUST DIDN'T LAST AND YOU DIDN'T HAVE MONEY TO GET MORE.: NEVER TRUE

## 2025-04-15 ASSESSMENT — PATIENT HEALTH QUESTIONNAIRE - PHQ9
SUM OF ALL RESPONSES TO PHQ QUESTIONS 1-9: 0
1. LITTLE INTEREST OR PLEASURE IN DOING THINGS: NOT AT ALL
SUM OF ALL RESPONSES TO PHQ QUESTIONS 1-9: 0
2. FEELING DOWN, DEPRESSED OR HOPELESS: NOT AT ALL
SUM OF ALL RESPONSES TO PHQ QUESTIONS 1-9: 0
SUM OF ALL RESPONSES TO PHQ QUESTIONS 1-9: 0

## 2025-04-15 NOTE — PROGRESS NOTES
\"Have you been to the ER, urgent care clinic since your last visit?  Hospitalized since your last visit?\"    NO    “Have you seen or consulted any other health care providers outside our system since your last visit?”    Ortho           
normal. There is no impacted cerumen.      Left Ear: Ear canal and external ear normal. There is no impacted cerumen.   Eyes:      General:         Right eye: No discharge.         Left eye: No discharge.      Extraocular Movements: Extraocular movements intact.   Neck:      Vascular: No carotid bruit.   Cardiovascular:      Rate and Rhythm: Normal rate and regular rhythm.      Heart sounds: Normal heart sounds. No murmur heard.  Pulmonary:      Effort: Pulmonary effort is normal. No respiratory distress.      Breath sounds: Normal breath sounds. No wheezing.   Abdominal:      General: Abdomen is flat. There is no distension.      Palpations: Abdomen is soft. There is no mass.      Tenderness: There is no abdominal tenderness.      Hernia: No hernia is present.   Musculoskeletal:         General: No swelling, tenderness or deformity.      Cervical back: Normal range of motion and neck supple. No tenderness.      Right lower leg: No edema.      Left lower leg: No edema.   Lymphadenopathy:      Cervical: No cervical adenopathy.   Skin:     General: Skin is warm and dry.      Findings: No erythema.   Neurological:      General: No focal deficit present.      Mental Status: She is oriented to person, place, and time. Mental status is at baseline.      Gait: Gait normal.   Psychiatric:         Mood and Affect: Mood normal.           ASSESSMENT and PLAN   Diagnosis Orders   1. Physical exam  CBC    Comprehensive Metabolic Panel   Check for measles immunity patient was born in 1965    Colonoscopy up-to-date mammogram is scheduled pelvic exam is up-to-date already completed Prevnar 20 Tdap up-to-date flu shot up-to-date declines additional COVID-vaccine    Eye exam up-to-date discussed diet weight loss blood pressure good    Follow-up with orthopedics Lipid Panel    TSH    Measles/Mumps/Rubella Immunity      2. Primary insomnia     Will try changing Restoril to trazodone discussed sleep hygiene    I have reviewed the note

## 2025-04-16 ENCOUNTER — RESULTS FOLLOW-UP (OUTPATIENT)
Age: 60
End: 2025-04-16

## 2025-04-16 ENCOUNTER — TRANSCRIBE ORDERS (OUTPATIENT)
Facility: HOSPITAL | Age: 60
End: 2025-04-16

## 2025-04-16 DIAGNOSIS — Z12.31 VISIT FOR SCREENING MAMMOGRAM: Primary | ICD-10-CM

## 2025-04-16 LAB
ALBUMIN SERPL-MCNC: 4.2 G/DL (ref 3.5–5)
ALBUMIN/GLOB SERPL: 1.5 (ref 1.1–2.2)
ALP SERPL-CCNC: 68 U/L (ref 45–117)
ALT SERPL-CCNC: 26 U/L (ref 12–78)
ANION GAP SERPL CALC-SCNC: 4 MMOL/L (ref 2–12)
AST SERPL-CCNC: 18 U/L (ref 15–37)
BILIRUB SERPL-MCNC: 0.3 MG/DL (ref 0.2–1)
BUN SERPL-MCNC: 15 MG/DL (ref 6–20)
BUN/CREAT SERPL: 16 (ref 12–20)
CALCIUM SERPL-MCNC: 9.5 MG/DL (ref 8.5–10.1)
CHLORIDE SERPL-SCNC: 110 MMOL/L (ref 97–108)
CHOLEST SERPL-MCNC: 199 MG/DL
CO2 SERPL-SCNC: 28 MMOL/L (ref 21–32)
CREAT SERPL-MCNC: 0.94 MG/DL (ref 0.55–1.02)
ERYTHROCYTE [DISTWIDTH] IN BLOOD BY AUTOMATED COUNT: 12.7 % (ref 11.5–14.5)
GLOBULIN SER CALC-MCNC: 2.8 G/DL (ref 2–4)
GLUCOSE SERPL-MCNC: 93 MG/DL (ref 65–100)
HCT VFR BLD AUTO: 38.2 % (ref 35–47)
HDLC SERPL-MCNC: 72 MG/DL
HDLC SERPL: 2.8 (ref 0–5)
HGB BLD-MCNC: 12.7 G/DL (ref 11.5–16)
LDLC SERPL CALC-MCNC: 109.2 MG/DL (ref 0–100)
MCH RBC QN AUTO: 28.2 PG (ref 26–34)
MCHC RBC AUTO-ENTMCNC: 33.2 G/DL (ref 30–36.5)
MCV RBC AUTO: 84.9 FL (ref 80–99)
NRBC # BLD: 0 K/UL (ref 0–0.01)
NRBC BLD-RTO: 0 PER 100 WBC
PLATELET # BLD AUTO: 216 K/UL (ref 150–400)
PMV BLD AUTO: 12.8 FL (ref 8.9–12.9)
POTASSIUM SERPL-SCNC: 4.4 MMOL/L (ref 3.5–5.1)
PROT SERPL-MCNC: 7 G/DL (ref 6.4–8.2)
RBC # BLD AUTO: 4.5 M/UL (ref 3.8–5.2)
SODIUM SERPL-SCNC: 142 MMOL/L (ref 136–145)
TRIGL SERPL-MCNC: 89 MG/DL
TSH SERPL DL<=0.05 MIU/L-ACNC: 1.02 UIU/ML (ref 0.36–3.74)
VLDLC SERPL CALC-MCNC: 17.8 MG/DL
WBC # BLD AUTO: 5.1 K/UL (ref 3.6–11)

## 2025-04-17 LAB
MEV IGG SER IA-ACNC: 141 AU/ML
MUV IGG SER IA-ACNC: >300 AU/ML
RUBV IGG SERPL IA-ACNC: 5.21 INDEX

## 2025-06-03 ENCOUNTER — HOSPITAL ENCOUNTER (OUTPATIENT)
Facility: HOSPITAL | Age: 60
Discharge: HOME | End: 2025-06-06
Attending: INTERNAL MEDICINE
Payer: COMMERCIAL

## 2025-06-03 DIAGNOSIS — Z12.31 VISIT FOR SCREENING MAMMOGRAM: ICD-10-CM

## 2025-06-03 PROCEDURE — 77063 BREAST TOMOSYNTHESIS BI: CPT

## 2025-06-14 ENCOUNTER — TRANSCRIBE ORDERS (OUTPATIENT)
Facility: HOSPITAL | Age: 60
End: 2025-06-14

## 2025-06-14 DIAGNOSIS — Z12.31 ENCOUNTER FOR SCREENING MAMMOGRAM FOR MALIGNANT NEOPLASM OF BREAST: Primary | ICD-10-CM

## 2025-06-20 DIAGNOSIS — F51.01 PRIMARY INSOMNIA: ICD-10-CM

## 2025-06-20 RX ORDER — TEMAZEPAM 22.5 MG/1
22.5 CAPSULE ORAL NIGHTLY PRN
Qty: 90 CAPSULE | Refills: 1 | Status: SHIPPED | OUTPATIENT
Start: 2025-06-20 | End: 2025-12-17

## 2025-06-20 NOTE — TELEPHONE ENCOUNTER
Future Appointments:  Future Appointments   Date Time Provider Department Center   4/20/2026  9:30 AM Chloe Ornelas MD [facility]        Last Appointment With Me:  4/15/2025     Requested Prescriptions     Pending Prescriptions Disp Refills    temazepam (RESTORIL) 22.5 MG capsule 90 capsule 0     Sig: Take 1 capsule by mouth nightly as needed for Sleep for up to 180 days. Max Daily Amount: 22.5 mg

## 2025-06-20 NOTE — TELEPHONE ENCOUNTER
Caller requests Refill of:    temazepam (RESTORIL) 22.5 MG capsule      Pt states other medication prescribed for sleep did not work. Pt discontinued use.    Please [facility]:      [facility] - [facility] - P [phone] - F [phone]  [facility]  [place] [address]  Phone: [phone] Fax: [phone]         Visit / Appointment History:  Future Appointment at [facility]:  Visit date not found   Last Appointment With PCP:  4/15/2025       Caller confirmed instructions and dosages as correct.    Caller was advised that Meds will be refilled as soon as possible, however there can be a 48-72 business hour turn around on refill requests.

## 2025-08-21 ENCOUNTER — OFFICE VISIT (OUTPATIENT)
Age: 60
End: 2025-08-21
Payer: COMMERCIAL

## 2025-08-21 VITALS
DIASTOLIC BLOOD PRESSURE: 76 MMHG | WEIGHT: 193 LBS | HEIGHT: 68 IN | TEMPERATURE: 98.1 F | HEART RATE: 64 BPM | BODY MASS INDEX: 29.25 KG/M2 | OXYGEN SATURATION: 98 % | RESPIRATION RATE: 16 BRPM | SYSTOLIC BLOOD PRESSURE: 108 MMHG

## 2025-08-21 DIAGNOSIS — L03.011 PARONYCHIA OF FINGER OF RIGHT HAND: Primary | ICD-10-CM

## 2025-08-21 PROCEDURE — 99213 OFFICE O/P EST LOW 20 MIN: CPT | Performed by: STUDENT IN AN ORGANIZED HEALTH CARE EDUCATION/TRAINING PROGRAM

## 2025-08-21 RX ORDER — DOXYCYCLINE HYCLATE 100 MG
100 TABLET ORAL 2 TIMES DAILY
Qty: 14 TABLET | Refills: 0 | Status: SHIPPED | OUTPATIENT
Start: 2025-08-21 | End: 2025-08-28

## 2025-08-21 ASSESSMENT — PATIENT HEALTH QUESTIONNAIRE - PHQ9
1. LITTLE INTEREST OR PLEASURE IN DOING THINGS: NOT AT ALL
2. FEELING DOWN, DEPRESSED OR HOPELESS: NOT AT ALL
SUM OF ALL RESPONSES TO PHQ QUESTIONS 1-9: 0

## (undated) DEVICE — SURGICEL ENDOSCP APPL

## (undated) DEVICE — ARM DRAPE

## (undated) DEVICE — APPLICATOR FLEXIBLE 360D 40CM --

## (undated) DEVICE — SUTURE SZ 0 27IN 5/8 CIR UR-6  TAPER PT VIOLET ABSRB VICRYL J603H

## (undated) DEVICE — SUTURE MCRYL SZ 4-0 L27IN ABSRB UD L19MM PS-2 1/2 CIR PRIM Y426H

## (undated) DEVICE — PAD PT POS 36 IN SURGYPAD DISP

## (undated) DEVICE — TRI-LUMEN FILTERED TUBE SET WITH ACTIVATED CHARCOAL FILTER: Brand: AIRSEAL

## (undated) DEVICE — VCARE DX UTERINE MANIPULATOR/INJECTOR CANNULA: Brand: VCARE DX

## (undated) DEVICE — SEALANT TISS 10 CC FIBRIN VISTASEAL

## (undated) DEVICE — SOLUTION IRRIG 1000ML 0.9% SOD CHL USP POUR PLAS BTL

## (undated) DEVICE — SYSTEM EVAC SMOKE LAPARSCOPIC

## (undated) DEVICE — OBTRTR BLDELSS 8MM DISP -- DA VINCI - SNGL USE

## (undated) DEVICE — GLOVE ORANGE PI 7 1/2   MSG9075

## (undated) DEVICE — MORCELLATOR ENDO 15MM OBT DISPOSABLEXCISE

## (undated) DEVICE — GYN LAPAROSCOPY - SMH: Brand: MEDLINE INDUSTRIES, INC.

## (undated) DEVICE — SEAL UNIV 5-8MM DISP BX/10 -- DA VINCI XI - SNGL USE

## (undated) DEVICE — Device

## (undated) DEVICE — TROCAR SITE CLOSURE DEVICE: Brand: ENDO CLOSE

## (undated) DEVICE — SYR 10ML LUER LOK 1/5ML GRAD --

## (undated) DEVICE — COVER MPLR TIP CRV SCIS ACC DA VINCI

## (undated) DEVICE — POWDER HEMOSTAT GEL 3.0GR -- SURGICEL

## (undated) DEVICE — INSUFFLATION NEEDLE TO ESTABLISH PNEUMOPERITONEUM.: Brand: INSUFFLATION NEEDLE

## (undated) DEVICE — AIRSEAL 12 MM ACCESS PORT AND PALM GRIP OBTURATOR WITH BLADELESS OPTICAL TIP, 120 MM LENGTH: Brand: AIRSEAL

## (undated) DEVICE — TAPE,CLOTH/SILK,CURAD,3"X10YD,LF,40/CS: Brand: CURAD

## (undated) DEVICE — ELECTRO LUBE IS A SINGLE PATIENT USE DEVICE THAT IS INTENDED TO BE USED ON ELECTROSURGICAL ELECTRODES TO REDUCE STICKING.: Brand: KEY SURGICAL ELECTRO LUBE

## (undated) DEVICE — HANDLE LT SNAP ON ULT DURABLE LENS FOR TRUMPF ALC DISPOSABLE